# Patient Record
Sex: MALE | Race: WHITE | NOT HISPANIC OR LATINO | Employment: OTHER | URBAN - METROPOLITAN AREA
[De-identification: names, ages, dates, MRNs, and addresses within clinical notes are randomized per-mention and may not be internally consistent; named-entity substitution may affect disease eponyms.]

---

## 2021-07-22 ENCOUNTER — OFFICE VISIT (OUTPATIENT)
Dept: ORTHOPEDIC SURGERY | Facility: CLINIC | Age: 71
End: 2021-07-22

## 2021-07-22 VITALS
BODY MASS INDEX: 25.5 KG/M2 | DIASTOLIC BLOOD PRESSURE: 102 MMHG | SYSTOLIC BLOOD PRESSURE: 185 MMHG | HEIGHT: 77 IN | WEIGHT: 216 LBS | HEART RATE: 92 BPM

## 2021-07-22 DIAGNOSIS — M25.562 CHRONIC PAIN OF LEFT KNEE: ICD-10-CM

## 2021-07-22 DIAGNOSIS — M17.12 PRIMARY OSTEOARTHRITIS OF LEFT KNEE: Primary | ICD-10-CM

## 2021-07-22 DIAGNOSIS — G89.29 CHRONIC PAIN OF LEFT KNEE: ICD-10-CM

## 2021-07-22 PROCEDURE — 99204 OFFICE O/P NEW MOD 45 MIN: CPT | Performed by: PHYSICIAN ASSISTANT

## 2021-07-22 RX ORDER — FEXOFENADINE HCL 180 MG/1
180 TABLET ORAL DAILY
COMMUNITY
End: 2022-07-22

## 2021-07-22 RX ORDER — MULTIPLE VITAMINS W/ MINERALS TAB 9MG-400MCG
1 TAB ORAL DAILY
COMMUNITY

## 2021-07-22 RX ORDER — FAMOTIDINE 40 MG/1
1 TABLET, FILM COATED ORAL DAILY
COMMUNITY
Start: 2021-05-21

## 2021-07-22 RX ORDER — LOSARTAN POTASSIUM AND HYDROCHLOROTHIAZIDE 25; 100 MG/1; MG/1
1 TABLET ORAL DAILY
COMMUNITY
Start: 2021-05-04

## 2021-07-22 RX ORDER — ASPIRIN 81 MG/1
81 TABLET, CHEWABLE ORAL DAILY
COMMUNITY

## 2021-07-22 RX ORDER — IPRATROPIUM BROMIDE 42 UG/1
1 SPRAY, METERED NASAL AS NEEDED
COMMUNITY
Start: 2021-07-11 | End: 2022-07-22

## 2021-07-22 RX ORDER — PANTOPRAZOLE SODIUM 40 MG/1
1 TABLET, DELAYED RELEASE ORAL DAILY
COMMUNITY
Start: 2021-05-04

## 2021-07-22 NOTE — PROGRESS NOTES
[facility] [place] [facility] Note    Subjective     Chief Complaint   Patient presents with   • Left Knee - Pain        HPI  Dewey Avalos is a 70 y.o. male. New patient presents for evaluation of his left knee.  Symptoms/pain have been ongoing for 2-3 years.  YAMILE: No trauma, just wear and tear over the years.  History left knee arthroscopy [id number] by Dr. Bingham for debridement and Baker's cyst.    Pain scale: 3/10.  Severity of the pain mild to moderate.  Quality of the pain aching.  Associated symptoms swelling.  Activity related to pain walking, standing.  Pain relieved by rest, ice.  No reported numbness or tingling.  Prior treatments PT ([facility] in [place], [place]), steroid injection (10/2020-great, 5/2021-no relief), Aleve, Tylenol.    Now any increase in activity, increases knee pain.    No reported mechanical symptoms, such as locking or catching.    Denies fever, chills, night sweats or other constitutional symptoms.      Past Medical History:   Diagnosis Date   • Arthritis    • Back problem    • Hypertension    • Plantar fasciitis       Past Surgical History:   Procedure Laterality Date   • KNEE SURGERY Left 03/28/2014    Deshawn Alessioanayeli Proctor in [place] performed knee scope      Family History   Problem Relation Age of Onset   • Arthritis Mother    • Heart disease Father    • Arthritis Father    • Diabetes Brother    • Cancer Maternal Grandmother    • Diabetes Paternal Grandfather      Social History     Socioeconomic History   • Marital status:      Spouse name: Not on file   • Number of children: Not on file   • Years of education: Not on file   • Highest education level: Not on file   Tobacco Use   • Smoking status: Never Smoker   • Smokeless tobacco: Never Used   Substance and Sexual Activity   • Alcohol use: Yes     Comment: 12 drinks/week   • Drug use: Never      Current Outpatient Medications on File Prior to Visit   Medication Sig Dispense Refill   • aspirin 81 MG chewable  "tablet Chew 81 mg Daily.     • famotidine (PEPCID) 40 MG tablet Take 1 tablet by mouth Daily.     • fexofenadine (ALLEGRA) 180 MG tablet Take 180 mg by mouth Daily.     • ipratropium (ATROVENT) 0.06 % nasal spray 1 spray into the nostril(s) as directed by provider As Needed.     • losartan-hydrochlorothiazide (HYZAAR) 100-25 MG per tablet Take 1 tablet by mouth Daily.     • multivitamin with minerals (CENTRUM SILVER PO) Take 1 tablet by mouth Daily.     • pantoprazole (PROTONIX) 40 MG EC tablet Take 1 tablet by mouth Daily.       No current facility-administered medications on file prior to visit.      No Known Allergies     The following portions of the patient's history were reviewed and updated as appropriate: allergies, current medications, past family history, past medical history, past social history, past surgical history and problem list.    Review of Systems   Constitutional: Positive for activity change.   HENT: Positive for tinnitus.    Eyes: Negative.    Respiratory: Positive for cough.    Cardiovascular: Negative.    Gastrointestinal: Negative.    Endocrine: Negative.    Genitourinary: Positive for frequency.   Musculoskeletal: Positive for arthralgias and joint swelling.   Skin: Negative.    Allergic/Immunologic: Negative.    Neurological: Negative.    Hematological: Negative.    Psychiatric/Behavioral: Negative.         Objective      Physical Exam  BP (!) 185/102   Pulse 92   Ht 195.6 cm (77\")   Wt 98 kg (216 lb)   BMI 25.61 kg/m²     Body mass index is 25.61 kg/m².    GENERAL APPEARANCE: awake, alert & oriented x 3, in no acute distress and well developed, well nourished  PSYCH: normal mood and affect  LUNGS:  breathing nonlabored, no wheezing  EYES: sclera anicteric, pupils equal  CARDIOVASCULAR: palpable pulses. Capillary refill less than 2 seconds  INTEGUMENTARY: skin intact, no clubbing, cyanosis  NEUROLOGIC:  Normal Sensation         Ortho Exam  Left Knee  Alignment: Varus  Skin: Intact " without any erythema, warmth, swelling or evidence of infection.  Motion: 0-125° with crepitus.  Tenderness: Positive medial joint line, harshil/retropatellar.    Instability: Lachman  negative. Varus and valgus stress test in 0 and 30° negative.   Meniscus: Paula's medial pain without catch or click.    Patella: Compression/grind positive.  Motor: Grossly intact Q/HS/TA/GS/EHL/P  Sensory: Grossly intact DP/SP/S/S/T nerve distributions.  Vascular: 2+ DP/PT  pulses with brisk capillary refill into each digit.      Imaging/Studies  Ordered left knee plain films.  Imaging read/interpreted by Dr. Mendez    Imaging Results (Last 7 Days)     Procedure Component Value Units Date/Time    XR Knee 4+ View Left [[id number]] Resulted: 07/22/21 0925     Updated: 07/22/21 0928    Narrative:      Knee X-Ray    Indication: Pain    Study:  Upright AP, Skiers, Lateral, and Sunrise views of Left knee(s)    Comparison: None    Findings:    Patient appears to have severe degenerative changes in the medial   compartment.    There are mild degenerative changes in the lateral compartment.    There are severe changes in the patellofemoral compartment.    Patient has overall varus alignment.    Kellgren-Rakehs thGthrthathdtheth:th th5th Impression:   Severe medial compartment and  patellofemoral compartment degnerative   changes of the knee           Assessment/Plan        ICD-10-CM ICD-9-CM   1. Primary osteoarthritis of left knee  M17.12 715.16   2. Chronic pain of left knee  M25.562 719.46    G89.29 338.29       Orders Placed This Encounter   Procedures   • XR Knee 4+ View Left        -Primary osteoarthritis left knee causing chronic pain.  -Patient is a candidate for TKA but wants to exhaust all conservative measures first.  When he is ready for TKA, he would like to see Dr. Ugalde.  -Has never had visco supplementation or PRP.  -Would like to try visco injections--pre-authorization placed today.  -If not approved, did discuss PRP (understands this  procedure is self-pay) versus repeat steroid injection.  -Offered prescription strength NSAID, but will continue with Aleve for now.  -Offered repeat formal PT but instead chose home knee exercises (provided).  -Follow up when injections available to be started.  -Questions and concerns answered.      Medical Decision Making  Management Options : prescription/IM medicine  Data/Risk: radiology tests       Leticia Bello PA-C  07/22/21  14:10 EDT         EMR Dragon/Transcription disclaimer:  Much of this encounter note is an electronic transcription of spoken language to printed text. Electronic transcription of spoken language may permit erroneous, or at times, nonsensical words or phrases to be inadvertently transcribed. Although I have reviewed the note for such errors, some may still exist.

## 2021-08-26 ENCOUNTER — CLINICAL SUPPORT (OUTPATIENT)
Dept: ORTHOPEDIC SURGERY | Facility: CLINIC | Age: 71
End: 2021-08-26

## 2021-08-26 DIAGNOSIS — M17.12 PRIMARY OSTEOARTHRITIS OF LEFT KNEE: ICD-10-CM

## 2021-08-26 PROCEDURE — 20610 DRAIN/INJ JOINT/BURSA W/O US: CPT | Performed by: PHYSICIAN ASSISTANT

## 2021-08-26 NOTE — PROGRESS NOTES
CC: Follow-up left knee osteoarthritis, 1/3 Synvisc injection today    History of present illness: Patient presents for his first Synvisc injection to the left knee today.  At this time he denies any numbness or tingling into the distal extremity.  No fever, chills, night sweats or other constitutional symptoms.    See chart for PMH, PSH, Meds, All - reviewed.    Ortho exam:  Left knee  Skin is intact without redness, warmth or swelling/effusion.  No lesions or evidence of infection noted.  Motor/sensory: Grossly intact L2-S1.    Assessment/plan:  Left knee osteoarthritis    Proceed today with 1/3 of Synvisc injection.  Patient will follow-up in week for second injection.      After discussing risks of injection the patient gave consent to proceed.  His left knee was confirmed as the correct joint to be injected with a timeout.  The knee was then prepped with Hibiclens and injected with a prefilled syringe of Orthovisc without any resistance using anterior lateral approach, patient in seated position.  The patient tolerated procedure well.  Hemostasis was achieved and a Band-Aid was applied over the injection site.  I instructed the patient on signs and symptoms of infection.  They should report to the ED if any of these develop.  Recommended modifying activity to include rest, ice, elevation and/or heat along with oral pain medication as needed.  Patient observed ambulating normally after the injection.

## 2021-08-26 NOTE — PROGRESS NOTES
Procedure   Large Joint Arthrocentesis: L knee  Date/Time: 8/26/2021 8:28 AM  Consent given by: patient  Site marked: site marked  Timeout: Immediately prior to procedure a time out was called to verify the correct patient, procedure, equipment, support staff and site/side marked as required   Supporting Documentation  Indications: pain   Procedure Details  Location: knee - L knee  Preparation: Patient was prepped and draped in the usual sterile fashion  Needle size: 23 G  Approach: anterolateral  Medications administered: 8 mg/mL Hylan 16 MG/2ML  Patient tolerance: patient tolerated the procedure well with no immediate complications

## 2021-09-02 ENCOUNTER — CLINICAL SUPPORT (OUTPATIENT)
Dept: ORTHOPEDIC SURGERY | Facility: CLINIC | Age: 71
End: 2021-09-02

## 2021-09-02 DIAGNOSIS — M17.12 PRIMARY OSTEOARTHRITIS OF LEFT KNEE: Primary | ICD-10-CM

## 2021-09-02 PROCEDURE — 20610 DRAIN/INJ JOINT/BURSA W/O US: CPT | Performed by: PHYSICIAN ASSISTANT

## 2021-09-02 NOTE — PROGRESS NOTES
Procedure   Large Joint Arthrocentesis: L knee  Date/Time: 9/2/2021 8:23 AM  Consent given by: patient  Site marked: site marked  Timeout: Immediately prior to procedure a time out was called to verify the correct patient, procedure, equipment, support staff and site/side marked as required   Supporting Documentation  Indications: pain   Procedure Details  Location: knee - L knee  Preparation: Patient was prepped and draped in the usual sterile fashion  Needle size: 23 G  Approach: anterolateral  Medications administered: 8 mg/mL Hylan 16 MG/2ML  Patient tolerance: patient tolerated the procedure well with no immediate complications

## 2021-09-02 NOTE — PROGRESS NOTES
CC: Follow-up left knee osteoarthritis, 2/3 Synvisc injection today     History of present illness: Patient presents for his second Synvisc injection to the left knee today.  At this time he denies any numbness or tingling into the distal extremity.  No fever, chills, night sweats or other constitutional symptoms.    Patient reports he feels better having started the injections but he did notice some increased pain last couple days with going downstairs.     See chart for PMH, PSH, Meds, All - reviewed.     Ortho exam:  Left knee  Skin is intact without redness, warmth or swelling/effusion.  No lesions or evidence of infection noted.  Motor/sensory: Grossly intact L2-S1.     Assessment/plan:  Left knee osteoarthritis     Proceed today with 2/3 of Synvisc injection.  Patient will follow-up in week for third injection.       After discussing risks of injection the patient gave consent to proceed.  His left knee was confirmed as the correct joint to be injected with a timeout.  The knee was then prepped with Hibiclens and injected with a prefilled syringe of Orthovisc without any resistance using anterior lateral approach, patient in seated position.  The patient tolerated procedure well.  Hemostasis was achieved and a Band-Aid was applied over the injection site.  I instructed the patient on signs and symptoms of infection.  They should report to the ED if any of these develop.  Recommended modifying activity to include rest, ice, elevation and/or heat along with oral pain medication as needed.  Patient observed ambulating normally after the injection.

## 2021-09-09 ENCOUNTER — CLINICAL SUPPORT (OUTPATIENT)
Dept: ORTHOPEDIC SURGERY | Facility: CLINIC | Age: 71
End: 2021-09-09

## 2021-09-09 DIAGNOSIS — M17.12 PRIMARY OSTEOARTHRITIS OF LEFT KNEE: Primary | ICD-10-CM

## 2021-09-09 PROCEDURE — 20610 DRAIN/INJ JOINT/BURSA W/O US: CPT | Performed by: PHYSICIAN ASSISTANT

## 2021-09-09 NOTE — PROGRESS NOTES
CC: Follow-up left knee osteoarthritis, 3/3 Synvisc injection today     History of present illness: Patient presents for his third Synvisc injection to the left knee today.  At this time he denies any numbness or tingling into the distal extremity.  No fever, chills, night sweats or other constitutional symptoms.     Patient continues to note improvement with regards to pain control following start of the injection series.  He is happy with the outcome thus far.     See chart for PMH, PSH, Meds, All - reviewed.     Ortho exam:  Left knee  Skin is intact without redness, warmth or swelling/effusion.  No lesions or evidence of infection noted.  Motor/sensory: Grossly intact L2-S1.     Assessment/plan:  Left knee osteoarthritis     Proceed today with 3/3 of Synvisc injection.  Patient will follow-up in 6 months.  If he notes increasing pain sooner he will return to clinic sooner.  He was instructed that approximately 2 weeks prior to the appointment to give us a call if he would like to consider another viscosupplementation series so we can go on to begin the preauthorization process.       After discussing risks of injection the patient gave consent to proceed.  His left knee was confirmed as the correct joint to be injected with a timeout.  The knee was then prepped with Hibiclens and injected with a prefilled syringe of Orthovisc without any resistance using anterior lateral approach, patient in seated position.  The patient tolerated procedure well.  Hemostasis was achieved and a Band-Aid was applied over the injection site.  I instructed the patient on signs and symptoms of infection.  They should report to the ED if any of these develop.  Recommended modifying activity to include rest, ice, elevation and/or heat along with oral pain medication as needed.  Patient observed ambulating normally after the injection.

## 2021-09-09 NOTE — PROGRESS NOTES
Procedure   Large Joint Arthrocentesis: L knee  Date/Time: 9/9/2021 8:28 AM  Consent given by: patient  Site marked: site marked  Timeout: Immediately prior to procedure a time out was called to verify the correct patient, procedure, equipment, support staff and site/side marked as required   Supporting Documentation  Indications: pain   Procedure Details  Location: knee - L knee  Preparation: Patient was prepped and draped in the usual sterile fashion  Needle size: 23 G  Approach: anterolateral  Medications administered: 8 mg/mL Hylan 16 MG/2ML  Patient tolerance: patient tolerated the procedure well with no immediate complications

## 2022-02-17 ENCOUNTER — TELEPHONE (OUTPATIENT)
Dept: ORTHOPEDIC SURGERY | Facility: CLINIC | Age: 72
End: 2022-02-17

## 2022-02-17 NOTE — TELEPHONE ENCOUNTER
Caller: MELVIN MANZANO    Relationship to patient: SELF    Best call back number: [phone]    Chief complaint: LEFT KNEE PAIN    Type of visit: GEL INJECTIONS    Requested date: 3/10    If rescheduling, when is the original appointment: N/A    Additional notes: WAS TOLD HE COULD DO GEL SHOTS WHEN HE COMES IN FOR HIS FOLLOW UP IN MARCH

## 2022-02-18 ENCOUNTER — TELEPHONE (OUTPATIENT)
Dept: ORTHOPEDIC SURGERY | Facility: CLINIC | Age: 72
End: 2022-02-18

## 2022-02-18 DIAGNOSIS — M17.12 PRIMARY OSTEOARTHRITIS OF LEFT KNEE: Primary | ICD-10-CM

## 2022-02-18 NOTE — TELEPHONE ENCOUNTER
Called Pt to inform him that Leticia would have to put a new order in to make sure Injections are approved. Once approved we will contact him.        Can an order be put in for Gel injections please

## 2022-02-18 NOTE — TELEPHONE ENCOUNTER
Leticia- Pt wanting to repeat gel injections when he comes in for his follow up 3/10. Can you go ahead and place order for left knee Visco?    We did SynVisc last time just FYI.    Thanks!  Rubi

## 2022-03-10 ENCOUNTER — CLINICAL SUPPORT (OUTPATIENT)
Dept: ORTHOPEDIC SURGERY | Facility: CLINIC | Age: 72
End: 2022-03-10

## 2022-03-10 DIAGNOSIS — M17.12 PRIMARY OSTEOARTHRITIS OF LEFT KNEE: Primary | ICD-10-CM

## 2022-03-10 PROCEDURE — 20610 DRAIN/INJ JOINT/BURSA W/O US: CPT | Performed by: PHYSICIAN ASSISTANT

## 2022-03-10 NOTE — PROGRESS NOTES
Procedure   Large Joint Arthrocentesis: L knee  Date/Time: 3/10/2022 9:05 AM  Consent given by: patient  Site marked: site marked  Timeout: Immediately prior to procedure a time out was called to verify the correct patient, procedure, equipment, support staff and site/side marked as required   Supporting Documentation  Indications: pain   Procedure Details  Location: knee - L knee  Preparation: Patient was prepped and draped in the usual sterile fashion  Needle size: 22 G  Approach: anterolateral  Medications administered: 8 mg/mL Hylan 16 MG/2ML  Patient tolerance: patient tolerated the procedure well with no immediate complications

## 2022-03-10 NOTE — PROGRESS NOTES
CC: Follow-up left knee osteoarthritis, 1/3 Synvisc injection today     History of present illness: Patient presents for his first Synvisc injection to the left knee today.  At this time he denies any numbness or tingling into the distal extremity.  No fever, chills, night sweats or other constitutional symptoms.    Previous series completed 9/9/2021.  Patient states that he noted improvement approximately 2 to 3 weeks after the series was completed.  He is okay now but starting to have pain return to the knee.     See chart for PMH, PSH, Meds, All - reviewed.     Ortho exam:  Left knee  Skin is intact without redness, warmth or swelling/effusion.  No lesions or evidence of infection noted.  Tenderness: Medial joint line.  Motor/sensory: Grossly intact L2-S1.     Assessment/plan:  Left knee osteoarthritis     Proceed today with 1/3 of Synvisc injection.  Patient will follow-up in 1 week for 2 of 3 Synvisc injection.  Patient also requesting to have his hip looked at at either next week's appointment or the following appointment.  Patient will require imaging of the hip for that appointment.      After discussing risks of injection the patient gave consent to proceed.  His left knee was confirmed as the correct joint to be injected with a timeout.  The knee was then prepped with Hibiclens and injected with a prefilled syringe of Orthovisc without any resistance using anterior lateral approach, patient in seated position.  The patient tolerated procedure well.  Hemostasis was achieved and a Band-Aid was applied over the injection site.  I instructed the patient on signs and symptoms of infection.  They should report to the ED if any of these develop.  Recommended modifying activity to include rest, ice, elevation and/or heat along with oral pain medication as needed.  Patient observed ambulating normally after the injection.

## 2022-03-17 ENCOUNTER — CLINICAL SUPPORT (OUTPATIENT)
Dept: ORTHOPEDIC SURGERY | Facility: CLINIC | Age: 72
End: 2022-03-17

## 2022-03-17 DIAGNOSIS — M17.12 PRIMARY OSTEOARTHRITIS OF LEFT KNEE: ICD-10-CM

## 2022-03-17 PROCEDURE — 20610 DRAIN/INJ JOINT/BURSA W/O US: CPT | Performed by: PHYSICIAN ASSISTANT

## 2022-03-17 NOTE — PROGRESS NOTES
CC: Follow-up left knee osteoarthritis, 2/3 Synvisc injection today     History of present illness: Patient presents for his second Synvisc injection to the left knee today.  At this time he denies any numbness or tingling into the distal extremity.  No fever, chills, night sweats or other constitutional symptoms.     Patient states his left knee is doing good.     See chart for PMH, PSH, Meds, All - reviewed.     Ortho exam:  Left knee  Skin is intact without redness, warmth or swelling/effusion.  No lesions or evidence of infection noted.  Tenderness: Medial joint line.  Motor/sensory: Grossly intact L2-S1.     Assessment/plan:  Left knee osteoarthritis     Proceed today with 2/3 of Synvisc injection.  Patient will follow-up in 1 week for third Synvisc injection.  Patient will have his hip looked at next week.  We will need preclinic imaging.        After discussing risks of injection the patient gave consent to proceed.  His left knee was confirmed as the correct joint to be injected with a timeout.  The knee was then prepped with Hibiclens and injected with a prefilled syringe of Orthovisc without any resistance using anterior lateral approach, patient in seated position.  The patient tolerated procedure well.  Hemostasis was achieved and a Band-Aid was applied over the injection site.  I instructed the patient on signs and symptoms of infection.  They should report to the ED if any of these develop.  Recommended modifying activity to include rest, ice, elevation and/or heat along with oral pain medication as needed.  Patient observed ambulating normally after the injection.

## 2022-03-17 NOTE — PROGRESS NOTES
Procedure   - Large Joint Arthrocentesis: L knee on 3/17/2022 1:07 PM  Indications: pain  Details: 22 G needle, anterolateral approach  Medications: 8 mg/mL Hylan 16 MG/2ML  Outcome: tolerated well, no immediate complications  Procedure, treatment alternatives, risks and benefits explained, specific risks discussed. Consent was given by the patient. Immediately prior to procedure a time out was called to verify the correct patient, procedure, equipment, support staff and site/side marked as required. Patient was prepped and draped in the usual sterile fashion.

## 2022-03-24 ENCOUNTER — CLINICAL SUPPORT (OUTPATIENT)
Dept: ORTHOPEDIC SURGERY | Facility: CLINIC | Age: 72
End: 2022-03-24

## 2022-03-24 DIAGNOSIS — M17.12 PRIMARY OSTEOARTHRITIS OF LEFT KNEE: ICD-10-CM

## 2022-03-24 DIAGNOSIS — M16.11 PRIMARY OSTEOARTHRITIS OF RIGHT HIP: ICD-10-CM

## 2022-03-24 DIAGNOSIS — M54.50 RIGHT-SIDED LOW BACK PAIN WITHOUT SCIATICA, UNSPECIFIED CHRONICITY: ICD-10-CM

## 2022-03-24 DIAGNOSIS — M25.551 RIGHT HIP PAIN: Primary | ICD-10-CM

## 2022-03-24 PROCEDURE — 99214 OFFICE O/P EST MOD 30 MIN: CPT | Performed by: PHYSICIAN ASSISTANT

## 2022-03-24 PROCEDURE — 20610 DRAIN/INJ JOINT/BURSA W/O US: CPT | Performed by: PHYSICIAN ASSISTANT

## 2022-03-24 NOTE — PROGRESS NOTES
Procedure        [facility] [place] [facility] Note        Subjective     CC: Pain of the Right Hip and Follow-up (Left knee synvisc #2 injection)      TAYE Avalos is a 71 y.o. male. Patient returns today for his 3/3 Synvisc injection to his left knee and wanting his right hip evaluated. Knee doing well with Synvisc injections.    Right hip pain times 3 months. Localized pain to right back into buttock/SI region, occasionally felt groin discomfort. Has been doing pelvic exercises on own which has helped his back. In college he did sustain injury to right side, undercut during lay-up.    Pain scale: 5/10.  Severity of the pain intermittent, moderate.  Quality of the pain dull, aching, throbbing.  Associated symptoms stiffness.  Activity related to pain walking, stair climbing.  Pain relieved by resting.  No reported numbness or tingling.  Prior treatments home exercises, NSAIDs.     ROS:    Constiutional:Pt denies fever, chills, nausea, or vomiting.  MSK:as above        Objective      Past Medical History  Past Medical History:   Diagnosis Date   • Ankle sprain     Numerous sprains over the years.   • Arthritis    • Arthritis of back 2012   • Back problem    • Hip arthrosis 2013   • Hypertension    • Knee swelling 2014   • Lumbosacral disc disease 2018   • Periarthritis of shoulder 2013   • Plantar fasciitis          Physical Exam  There were no vitals taken for this visit.    There is no height or weight on file to calculate BMI.    Patient is well nourished and well developed.        Ortho Exam  Right Hip  Skin: Grossly intact without any erythema, warmth or swelling.  Tenderness: Right lower back into SI/buttock region. No groin pain today.  Motion: Flx 115°, internal rotation 30°, external rotation 30°, abduction 45°.   Testing: Stinchfield negative, Hip flxe to 90° with IR/ER negative, SLR intact and pain-free.  Strength: Hip flx/ext/abd 5/5, Q/HS 5/5.  Motor: Grossly intact  Q/HS/TA/GS/EHL/P.  Sensory: Grossly intact to LT L2-S1.    Left knee  Skin is intact without redness, warmth or swelling/effusion.  No lesions or evidence of infection noted.  Motor/sensory: Grossly intact L2-S1.      Imaging/Labs/EMG Reviewed:  Ordered right hip plain films.  Imaging read/interpreted by Dr. Burgos.    Indication: Right hip pain     Comparison: No prior xrays are available for comparison     IMPRESSION:      AP pelvis, right hip: moderate joint space narrowing,  there are marginal osteophytes visualized at the femoral head-neck junction and acetabular margins  AP pelvis, left hip: moderate joint space narrowing,  there are marginal osteophytes visualized at the femoral head-neck junction and acetabular margins.       Assessment:  1. Right hip pain    2. Right-sided low back pain without sciatica, unspecified chronicity    3. Primary osteoarthritis of right hip    4. Primary osteoarthritis of left knee        Plan:  1. Rip hip pain/low back pain--may need further evaluation of low back if symptoms persist.  2. Moderate arthritis of right hip--discussed exercises, NSAIDs--if symptoms persist may require intra-articular hip injection to isolate whether symptoms coming from back or hip joint, only moderate hip degenerative joint changes on imaging.  3. Left knee arthritis--provided 3/3 Synvisc injection today.  4. Offered prescription strength NSAIDs, wants to continue with OTC as needed.  5. Follow up in 6 months for repeat visco series to left knee. Understands he needs to contact clinic 1-2 weeks in advance for pre-authorization of injection.  6. Questions and concerns answered.    After discussing risks of injection the patient gave consent to proceed.  His left knee was confirmed as the correct joint to be injected with a timeout.  The knee was then prepped with Hibiclens and injected with a prefilled syringe of Orthovisc without any resistance using anterior lateral approach, patient in seated  position.  The patient tolerated procedure well.  Hemostasis was achieved and a Band-Aid was applied over the injection site.  I instructed the patient on signs and symptoms of infection.  They should report to the ED if any of these develop.  Recommended modifying activity to include rest, ice, elevation and/or heat along with oral pain medication as needed.  Patient observed ambulating normally after the injection.      Leticia Bello PA-C  03/27/22  12:08 EDT      Dictated Utilizing Dragon Dictation.  Large Joint Arthrocentesis: L knee  Date/Time: 3/24/2022 10:14 AM  Consent given by: patient  Site marked: site marked  Timeout: Immediately prior to procedure a time out was called to verify the correct patient, procedure, equipment, support staff and site/side marked as required   Supporting Documentation  Indications: pain   Procedure Details  Location: knee - L knee  Preparation: Patient was prepped and draped in the usual sterile fashion  Needle size: 22 G  Approach: anterolateral  Medications administered: 8 mg/mL Hylan 16 MG/2ML  Patient tolerance: patient tolerated the procedure well with no immediate complications

## 2022-05-02 DIAGNOSIS — R05.9 COUGH: Primary | ICD-10-CM

## 2022-07-22 ENCOUNTER — OFFICE VISIT (OUTPATIENT)
Dept: ORTHOPEDIC SURGERY | Facility: CLINIC | Age: 72
End: 2022-07-22

## 2022-07-22 VITALS
DIASTOLIC BLOOD PRESSURE: 98 MMHG | SYSTOLIC BLOOD PRESSURE: 166 MMHG | HEIGHT: 77 IN | WEIGHT: 210 LBS | BODY MASS INDEX: 24.79 KG/M2

## 2022-07-22 DIAGNOSIS — M16.11 PRIMARY OSTEOARTHRITIS OF RIGHT HIP: ICD-10-CM

## 2022-07-22 DIAGNOSIS — M54.50 RIGHT-SIDED LOW BACK PAIN WITHOUT SCIATICA, UNSPECIFIED CHRONICITY: ICD-10-CM

## 2022-07-22 DIAGNOSIS — M25.551 RIGHT HIP PAIN: Primary | ICD-10-CM

## 2022-07-22 PROCEDURE — 99214 OFFICE O/P EST MOD 30 MIN: CPT | Performed by: PHYSICIAN ASSISTANT

## 2022-07-22 RX ORDER — DOXAZOSIN 2 MG/1
TABLET ORAL
COMMUNITY
Start: 2022-06-03

## 2022-07-22 NOTE — PROGRESS NOTES
"    [facility] [place] [facility] Note        Subjective     CC: Follow-up (4 month f/u - Right hip pain )      HPI    Dewey Avalos is a 71 y.o. male. Established patient presents for evaluation of right hip pain.  Symptoms/pain have been ongoing for month.  YAMILE: No recent injury or trauma. Initially discussed right hip/buttock/SI pain with occasional groin discomfort back in March (did have injury to right side--undercut during layup). Pain is worsening.    Pain scale: 8/10.  Severity of the pain moderate to severe.  Quality of the pain aching, throbbing.  Associated symptoms stiffness.  Activity related to pain walking, stair climbing, lying on the affected side, rising from a seated position.  Pain eased by resting.  No reported numbness or tingling.  Prior treatments home exercises, NSAIDs, PT.    Localizes pain to inner thigh/groin and lateral hip, +/- buttock pain.    Overall, patient's symptoms are worsening.    ROS:    Constiutional:Pt denies fever, chills, nausea, or vomiting.  MSK:as above        Objective      Past Medical History  Past Medical History:   Diagnosis Date   • Ankle sprain     Numerous sprains over the years.   • Arthritis    • Arthritis of back 2012   • Back problem    • Hip arthrosis 2013   • Hypertension    • Knee swelling 2014   • Lumbosacral disc disease 2018   • Periarthritis of shoulder 2013   • Plantar fasciitis          Physical Exam  /98   Ht 195.6 cm (77\")   Wt 95.3 kg (210 lb)   BMI 24.90 kg/m²     Body mass index is 24.9 kg/m².    Patient is well nourished and well developed.        Ortho Exam  Right Hip  Tenderness: Anterior hip/flexors positive.  Groin positive.  Lateral hip/GT positive. Posterior hip/SIJ mild discomfort. Positive \"C\" sign.  Motion: Flx 110°, internal rotation 30°, external rotation 30°.   Testing: Stinchfield positive, Hip flxe to 90° with IR/ER negative.  Strength: Hip flx/ext/abd 5/5, Q/HS 5/5.  Motor: Grossly intact Q/HS/TA/GS/EHL/P.  Sensory: " Grossly intact to LT L2-S1.      Imaging/Labs/EMG Reviewed:  No new imaging today.      Assessment:  1. Right hip pain    2. Primary osteoarthritis of right hip    3. Right-sided low back pain without sciatica, unspecified chronicity        Plan:  1. Rip hip pain, hip osteoarthritis and right sided low back pain--worsening right hip pain.  2. Moderate arthritis of right hip--discussed right hip x-rays today, which demonstrated arthritis. Recommend proceed with a right intra-articular hip injection, for both diagnostic and therapeutic purposes to isolate whether symptoms coming from back or hip joint.  3. Right intra-articular hip injection ordered.  4. Recommend OTC NSAIDs/pain medication.   5. Follow up in 4 weeks after injection.  6. Questions and concerns answered.      Leticia Bello PA-C  07/22/22  11:13 EDT      Dictated Utilizing Dragon Dictation.

## 2022-07-22 NOTE — PROGRESS NOTES
[facility] [place] [facility] Note    Subjective     Chief Complaint   Patient presents with   • Right Hip - Pain        HPI  Dewey Avalos is a 71 y.o. male.  ***    Past Medical History:   Diagnosis Date   • Ankle sprain     Numerous sprains over the years.   • Arthritis    • Arthritis of back 2012   • Back problem    • Hip arthrosis 2013   • Hypertension    • Knee swelling 2014   • Lumbosacral disc disease 2018   • Periarthritis of shoulder 2013   • Plantar fasciitis       Past Surgical History:   Procedure Laterality Date   • KNEE SURGERY Left 03/28/2014    Deshawn Proctor in [place] performed knee scope      Family History   Problem Relation Age of Onset   • Arthritis Mother    • Heart disease Father    • Arthritis Father    • Diabetes Brother    • Cancer Maternal Grandmother    • Diabetes Paternal Grandfather    • Diabetes Brother      Social History     Socioeconomic History   • Marital status:    Tobacco Use   • Smoking status: Never Smoker   • Smokeless tobacco: Never Used   Substance and Sexual Activity   • Alcohol use: Yes     Alcohol/week: 12.0 standard drinks     Types: 12 Cans of beer per week     Comment: 12 drinks/week   • Drug use: Never   • Sexual activity: Yes     Partners: Female     Birth control/protection: None      Current Outpatient Medications on File Prior to Visit   Medication Sig Dispense Refill   • aspirin 81 MG chewable tablet Chew 81 mg Daily.     • famotidine (PEPCID) 40 MG tablet Take 1 tablet by mouth Daily.     • fexofenadine (ALLEGRA) 180 MG tablet Take 180 mg by mouth Daily.     • ipratropium (ATROVENT) 0.06 % nasal spray 1 spray into the nostril(s) as directed by provider As Needed.     • losartan-hydrochlorothiazide (HYZAAR) 100-25 MG per tablet Take 1 tablet by mouth Daily.     • multivitamin with minerals tablet tablet Take 1 tablet by mouth Daily.     • pantoprazole (PROTONIX) 40 MG EC tablet Take 1 tablet by mouth Daily.       No current  "facility-administered medications on file prior to visit.      No Known Allergies     The following portions of the patient's history were reviewed and updated as appropriate: {history reviewed:20406::\"allergies\",\"current medications\",\"past family history\",\"past medical history\",\"past social history\",\"past surgical history\",\"problem list\"}.    Review of Systems   Constitutional: Negative.    HENT: Negative.    Eyes: Negative.    Respiratory: Negative.    Cardiovascular: Negative.    Gastrointestinal: Negative.    Endocrine: Negative.    Genitourinary: Negative.    Musculoskeletal: Positive for arthralgias.   Skin: Negative.    Allergic/Immunologic: Negative.    Neurological: Negative.    Hematological: Negative.    Psychiatric/Behavioral: Negative.         Objective      Physical Exam  There were no vitals taken for this visit.    There is no height or weight on file to calculate BMI.    GENERAL APPEARANCE: awake, alert & oriented x 3, in no acute distress and well developed, well nourished  PSYCH: normal mood and affect  LUNGS:  breathing nonlabored, no wheezing  EYES: sclera anicteric, pupils equal  CARDIOVASCULAR: palpable pulses. Capillary refill less than 2 seconds  INTEGUMENTARY: skin intact, no clubbing, cyanosis  NEUROLOGIC:  Normal Sensation and reflexes       Ortho Exam  ***    Imaging/Studies  Imaging Results (Last 7 Days)     ** No results found for the last 168 hours. **          Assessment/Plan      No diagnosis found.    No orders of the defined types were placed in this encounter.       ***    Medical Decision Making  Management Options : {[facility] - Management Options:74987}  Data/Risk: {[facility] - Data/Risk:45847}    Nnamdi Brizuela MA  07/22/22  09:02 EDT               EMR Dragon/Transcription disclaimer:  Much of this encounter note is an electronic transcription of spoken language to printed text. Electronic transcription of spoken language may permit erroneous, or at times, nonsensical words or " phrases to be inadvertently transcribed. Although I have reviewed the note for such errors, some may still exist.

## 2022-07-27 ENCOUNTER — TELEPHONE (OUTPATIENT)
Dept: ORTHOPEDIC SURGERY | Facility: CLINIC | Age: 72
End: 2022-07-27

## 2022-07-27 NOTE — TELEPHONE ENCOUNTER
08 03 22 GUIDED INJ 5TH FL 07/27/2022 PATIENT CALLED MADE FOLLOW UP AFTER THE 08 03 22 GUIDED INJ // HOWEVER - NEEDS TO KNOW WHEN TO STOP HIS BLOOD THINNER (ASPIRIN)?  HUB ADVISED APTIENT SOMEONE FROM CLINICAL WOULD CALL HIM WITH THAT INFORMATION.

## 2022-08-03 ENCOUNTER — HOSPITAL ENCOUNTER (OUTPATIENT)
Dept: GENERAL RADIOLOGY | Facility: HOSPITAL | Age: 72
Discharge: HOME | End: 2022-08-03
Admitting: PHYSICIAN ASSISTANT

## 2022-08-03 DIAGNOSIS — M16.11 PRIMARY OSTEOARTHRITIS OF RIGHT HIP: ICD-10-CM

## 2022-08-03 DIAGNOSIS — M25.551 RIGHT HIP PAIN: ICD-10-CM

## 2022-08-03 PROCEDURE — 77002 NEEDLE LOCALIZATION BY XRAY: CPT

## 2022-08-03 PROCEDURE — 25010000002 TRIAMCINOLONE PER 10 MG: Performed by: PHYSICIAN ASSISTANT

## 2022-08-03 PROCEDURE — 25010000002 IOPAMIDOL 61 % SOLUTION: Performed by: PHYSICIAN ASSISTANT

## 2022-08-03 RX ORDER — LIDOCAINE HYDROCHLORIDE 10 MG/ML
5 INJECTION, SOLUTION EPIDURAL; INFILTRATION; INTRACAUDAL; PERINEURAL ONCE
Status: COMPLETED | OUTPATIENT
Start: 2022-08-03 | End: 2022-08-03

## 2022-08-03 RX ORDER — LIDOCAINE HYDROCHLORIDE 10 MG/ML
3 INJECTION, SOLUTION EPIDURAL; INFILTRATION; INTRACAUDAL; PERINEURAL ONCE
Status: DISCONTINUED | OUTPATIENT
Start: 2022-08-03 | End: 2022-08-04 | Stop reason: HOSPADM

## 2022-08-03 RX ORDER — TRIAMCINOLONE ACETONIDE 40 MG/ML
80 INJECTION, SUSPENSION INTRA-ARTICULAR; INTRAMUSCULAR ONCE
Status: COMPLETED | OUTPATIENT
Start: 2022-08-03 | End: 2022-08-03

## 2022-08-03 RX ORDER — BUPIVACAINE HYDROCHLORIDE 2.5 MG/ML
3 INJECTION, SOLUTION EPIDURAL; INFILTRATION; INTRACAUDAL ONCE
Status: DISCONTINUED | OUTPATIENT
Start: 2022-08-03 | End: 2022-08-04 | Stop reason: HOSPADM

## 2022-08-03 RX ADMIN — IOPAMIDOL 5 ML: 612 INJECTION, SOLUTION INTRAVENOUS at 08:53

## 2022-08-03 RX ADMIN — LIDOCAINE HYDROCHLORIDE 5 ML: 10 INJECTION, SOLUTION EPIDURAL; INFILTRATION; INTRACAUDAL; PERINEURAL at 08:53

## 2022-08-03 RX ADMIN — TRIAMCINOLONE ACETONIDE 80 MG: 40 INJECTION, SUSPENSION INTRA-ARTICULAR; INTRAMUSCULAR at 08:53

## 2022-08-29 ENCOUNTER — OFFICE VISIT (OUTPATIENT)
Dept: ORTHOPEDIC SURGERY | Facility: CLINIC | Age: 72
End: 2022-08-29

## 2022-08-29 VITALS
SYSTOLIC BLOOD PRESSURE: 132 MMHG | BODY MASS INDEX: 41.23 KG/M2 | HEIGHT: 60 IN | WEIGHT: 210 LBS | DIASTOLIC BLOOD PRESSURE: 86 MMHG

## 2022-08-29 DIAGNOSIS — M16.11 PRIMARY OSTEOARTHRITIS OF RIGHT HIP: Primary | ICD-10-CM

## 2022-08-29 PROCEDURE — 99212 OFFICE O/P EST SF 10 MIN: CPT | Performed by: PHYSICIAN ASSISTANT

## 2022-10-04 ENCOUNTER — TELEPHONE (OUTPATIENT)
Dept: ORTHOPEDIC SURGERY | Facility: CLINIC | Age: 72
End: 2022-10-04

## 2022-10-04 DIAGNOSIS — M17.12 PRIMARY OSTEOARTHRITIS OF LEFT KNEE: ICD-10-CM

## 2022-10-04 DIAGNOSIS — M16.11 PRIMARY OSTEOARTHRITIS OF RIGHT HIP: Primary | ICD-10-CM

## 2022-10-04 NOTE — TELEPHONE ENCOUNTER
Caller: Dewey Avalos    Relationship to patient: Self    Best call back number: [phone]    Chief complaint: LEFT KNEE - GELL INJETION    Type of visit: INJECTION (10.24.22)    Requested date: STARTING AT APPT 10.24.22 -     Additional notes:PATIENT WAS TOLD TO CALL A FEW WEEKS BEFORE APPT FOR OFFICE TO GET APPROVAL /AUTH FOR GET INJECTIONS LATER THIS MONTH. PLEASE CALL AND ADVISE PATIENT ON SCHEDULING  FOR INJECTIONS. TY

## 2022-10-24 ENCOUNTER — CLINICAL SUPPORT (OUTPATIENT)
Dept: ORTHOPEDIC SURGERY | Facility: CLINIC | Age: 72
End: 2022-10-24

## 2022-10-24 DIAGNOSIS — M17.12 PRIMARY OSTEOARTHRITIS OF LEFT KNEE: Primary | ICD-10-CM

## 2022-10-24 PROCEDURE — 20610 DRAIN/INJ JOINT/BURSA W/O US: CPT | Performed by: PHYSICIAN ASSISTANT

## 2022-10-24 NOTE — PROGRESS NOTES
CC: Follow-up left knee osteoarthritis, 1/3 Synvisc injection today    History of present illness: Patient presents for his first Synvisc injection to the left knee today.  At this time the patient denies any numbness or tingling into the distal extremity.  No fever, chills, night sweats or other constitutional symptoms.    See chart for PMH, PSH, Meds, All - reviewed.    Ortho exam:  Left knee  Skin is intact without redness, warmth or swelling/effusion.  No lesions or evidence of infection noted.  Motor/sensory: Grossly intact L2-S1.    Assessment/plan:  Left knee osteoarthritis    Proceed today with 1/3 of Synvisc injection.  Patient will follow-up in week for second injection.      After discussing risks of injection the patient gave consent to proceed.  His left knee was confirmed as the correct joint to be injected with a timeout.  The knee was then prepped with Hibiclens and injected with a prefilled syringe of Synvisc without any resistance using anterior lateral approach, patient in seated position.  The patient tolerated procedure well.  Hemostasis was achieved and a Band-Aid was applied over the injection site.  I instructed the patient on signs and symptoms of infection.  They should report to the ED if any of these develop.  Recommended modifying activity to include rest, ice, elevation and/or heat along with oral pain medication as needed.

## 2022-10-24 NOTE — PROGRESS NOTES
Procedure   Large Joint Arthrocentesis: L knee  Date/Time: 10/24/2022 11:36 AM  Consent given by: patient  Site marked: site marked  Timeout: Immediately prior to procedure a time out was called to verify the correct patient, procedure, equipment, support staff and site/side marked as required   Supporting Documentation  Indications: pain   Procedure Details  Location: knee - L knee  Preparation: Patient was prepped and draped in the usual sterile fashion  Needle size: 22 G  Approach: anterolateral  Medications administered: 8 mg/mL Hylan 16 MG/2ML  Patient tolerance: patient tolerated the procedure well with no immediate complications

## 2022-10-31 ENCOUNTER — OFFICE VISIT (OUTPATIENT)
Dept: ORTHOPEDIC SURGERY | Facility: CLINIC | Age: 72
End: 2022-10-31

## 2022-10-31 DIAGNOSIS — M17.12 PRIMARY OSTEOARTHRITIS OF LEFT KNEE: Primary | ICD-10-CM

## 2022-10-31 PROCEDURE — 20610 DRAIN/INJ JOINT/BURSA W/O US: CPT | Performed by: PHYSICIAN ASSISTANT

## 2022-10-31 NOTE — PROGRESS NOTES
Procedure   - Large Joint Arthrocentesis: L knee on 10/31/2022 10:25 AM  Indications: pain  Details: 22 G needle, anterolateral approach  Medications: 8 mg/mL Hylan 16 MG/2ML  Outcome: tolerated well, no immediate complications  Procedure, treatment alternatives, risks and benefits explained, specific risks discussed. Consent was given by the patient. Immediately prior to procedure a time out was called to verify the correct patient, procedure, equipment, support staff and site/side marked as required. Patient was prepped and draped in the usual sterile fashion.

## 2022-10-31 NOTE — PROGRESS NOTES
CC: Follow-up left knee osteoarthritis, 2/3 Synvisc injection today     History of present illness: Patient presents for his second Synvisc injection to the left knee today.  At this time the patient denies any numbness or tingling into the distal extremity.  No fever, chills, night sweats or other constitutional symptoms.     See chart for PMH, PSH, Meds, All - reviewed.     Ortho exam:  Left knee  Skin is intact without redness, warmth or swelling/effusion.  No lesions or evidence of infection noted.  Motor/sensory: Grossly intact L2-S1.     Assessment/plan:  Left knee osteoarthritis     Proceed today with 2/3 of Synvisc injection.  Patient will follow-up in week for third injection.       After discussing risks of injection the patient gave consent to proceed.  His left knee was confirmed as the correct joint to be injected with a timeout.  The knee was then prepped with Hibiclens and injected with a prefilled syringe of Synvisc without any resistance using anterior lateral approach, patient in seated position.  The patient tolerated procedure well.  Hemostasis was achieved and a Band-Aid was applied over the injection site.  I instructed the patient on signs and symptoms of infection.  They should report to the ED if any of these develop.  Recommended modifying activity to include rest, ice, elevation and/or heat along with oral pain medication as needed.

## 2022-11-07 ENCOUNTER — CLINICAL SUPPORT (OUTPATIENT)
Dept: ORTHOPEDIC SURGERY | Facility: CLINIC | Age: 72
End: 2022-11-07

## 2022-11-07 DIAGNOSIS — M17.12 PRIMARY OSTEOARTHRITIS OF LEFT KNEE: Primary | ICD-10-CM

## 2022-11-07 PROCEDURE — 20610 DRAIN/INJ JOINT/BURSA W/O US: CPT | Performed by: PHYSICIAN ASSISTANT

## 2022-11-07 NOTE — PROGRESS NOTES
CC: Follow-up left knee osteoarthritis, 3/3 Synvisc injection today     History of present illness: Patient presents for his third Synvisc injection to the left knee today.  At this time the patient denies any numbness or tingling into the distal extremity.  No fever, chills, night sweats or other constitutional symptoms.    Continues to note improvement after the first 2 injections.     See chart for PMH, PSH, Meds, All - reviewed.     Ortho exam:  Left knee  Skin is intact without redness, warmth or swelling/effusion.  No lesions or evidence of infection noted.  Motor/sensory: Grossly intact L2-S1.     Assessment/plan:  Left knee osteoarthritis     Proceed today with 3/3 of Synvisc injection.  Patient will follow-up 6 months +1-day for repeat viscosupplementation (appointment after 5/8/2023).  Patient understands that he needs to contact the clinic 1 to 2 weeks prior to that appointment so preauthorization can be placed.       After discussing risks of injection the patient gave consent to proceed.  His left knee was confirmed as the correct joint to be injected with a timeout.  The knee was then prepped with Hibiclens and injected with a prefilled syringe of Synvisc without any resistance using anterior lateral approach, patient in seated position.  The patient tolerated procedure well.  Hemostasis was achieved and a Band-Aid was applied over the injection site.  I instructed the patient on signs and symptoms of infection.  They should report to the ED if any of these develop.  Recommended modifying activity to include rest, ice, elevation and/or heat along with oral pain medication as needed.

## 2022-11-07 NOTE — PROGRESS NOTES
Procedure   Large Joint Arthrocentesis: L knee  Date/Time: 11/7/2022 1:30 PM  Consent given by: patient  Site marked: site marked  Timeout: Immediately prior to procedure a time out was called to verify the correct patient, procedure, equipment, support staff and site/side marked as required   Supporting Documentation  Indications: pain   Procedure Details  Location: knee - L knee  Preparation: Patient was prepped and draped in the usual sterile fashion  Needle size: 23 G  Approach: anterolateral  Medications administered: 8 mg/mL Hylan 16 MG/2ML  Patient tolerance: patient tolerated the procedure well with no immediate complications

## 2022-12-05 ENCOUNTER — OFFICE VISIT (OUTPATIENT)
Dept: ORTHOPEDIC SURGERY | Facility: CLINIC | Age: 72
End: 2022-12-05

## 2022-12-05 VITALS — DIASTOLIC BLOOD PRESSURE: 89 MMHG | SYSTOLIC BLOOD PRESSURE: 159 MMHG

## 2022-12-05 DIAGNOSIS — M16.11 PRIMARY OSTEOARTHRITIS OF RIGHT HIP: Primary | ICD-10-CM

## 2022-12-05 PROCEDURE — 99213 OFFICE O/P EST LOW 20 MIN: CPT | Performed by: ORTHOPAEDIC SURGERY

## 2022-12-05 NOTE — PROGRESS NOTES
[facility] [place]    Subjective     Chief Complaint   Patient presents with   • Follow-up     4 month recheck - Primary osteoarthritis of right hip- Fl guided hip injection 8/3/22        HPI    Dewey Avalos is a 72 y.o. male who presents with new problem of: right hip arthritis.  Onset: atraumatic and gradual in nature. The issue has been ongoing for 11 month(s). Pain is a 5/10 on the pain scale. Pain is described as aching. Associated symptoms include pain and stiffness. The pain is worse with walking, climbing stairs and lying on affected side; resting improve the pain. Previous treatments have included: NSAIDS.  Previous hip injection in August 2022, which helped significantly initially, and is still providing some relief.  He would like to discuss other options for his hip.    I have reviewed the following portions of the patient's history and agree with: History of Present Illness and Review of Systems    There is no problem list on file for this patient.    Past Medical History:   Diagnosis Date   • Ankle sprain     Numerous sprains over the years.   • Arthritis    • Arthritis of back 2012   • Back problem    • Hip arthrosis 2013   • Hypertension    • Knee swelling 2014   • Lumbosacral disc disease 2018   • Periarthritis of shoulder 2013   • Plantar fasciitis       Past Surgical History:   Procedure Laterality Date   • KNEE SURGERY Left 03/28/2014    Deshawn Proctor in [place] performed knee scope      Family History   Problem Relation Age of Onset   • Arthritis Mother    • Heart disease Father    • Arthritis Father    • Diabetes Brother    • Cancer Maternal Grandmother    • Diabetes Paternal Grandfather    • Diabetes Brother      Social History     Socioeconomic History   • Marital status:    Tobacco Use   • Smoking status: Never   • Smokeless tobacco: Never   Substance and Sexual Activity   • Alcohol use: Yes     Alcohol/week: 12.0 standard drinks     Types: 12 Cans of  beer per week     Comment: 12 drinks/week   • Drug use: Never   • Sexual activity: Yes     Partners: Female     Birth control/protection: None      Current Outpatient Medications on File Prior to Visit   Medication Sig Dispense Refill   • aspirin 81 MG chewable tablet Chew 81 mg Daily.     • doxazosin (CARDURA) 2 MG tablet      • famotidine (PEPCID) 40 MG tablet Take 1 tablet by mouth Daily.     • losartan-hydrochlorothiazide (HYZAAR) 100-25 MG per tablet Take 1 tablet by mouth Daily.     • multivitamin with minerals tablet tablet Take 1 tablet by mouth Daily.     • pantoprazole (PROTONIX) 40 MG EC tablet Take 1 tablet by mouth Daily.       No current facility-administered medications on file prior to visit.      No Known Allergies     Review of Systems   Constitutional: Negative for activity change, appetite change, chills, diaphoresis, fatigue, fever and unexpected weight change.   HENT: Negative for congestion, dental problem, drooling, ear discharge, ear pain, facial swelling, hearing loss, mouth sores, nosebleeds, postnasal drip, rhinorrhea, sinus pressure, sneezing, sore throat, tinnitus, trouble swallowing and voice change.    Eyes: Negative for photophobia, pain, discharge, redness, itching and visual disturbance.   Respiratory: Negative for apnea, cough, choking, chest tightness, shortness of breath, wheezing and stridor.    Cardiovascular: Negative for chest pain, palpitations and leg swelling.   Gastrointestinal: Negative for abdominal distention, abdominal pain, anal bleeding, blood in stool, constipation, diarrhea, nausea, rectal pain and vomiting.   Endocrine: Negative for cold intolerance, heat intolerance, polydipsia, polyphagia and polyuria.   Genitourinary: Negative for decreased urine volume, difficulty urinating, dysuria, enuresis, flank pain, frequency, genital sores, hematuria and urgency.   Musculoskeletal: Positive for arthralgias. Negative for back pain, gait problem, joint swelling,  myalgias, neck pain and neck stiffness.   Skin: Negative for color change, pallor, rash and wound.   Allergic/Immunologic: Negative for environmental allergies, food allergies and immunocompromised state.   Neurological: Negative for dizziness, tremors, seizures, syncope, facial asymmetry, speech difficulty, weakness, light-headedness, numbness and headaches.   Hematological: Negative for adenopathy. Does not bruise/bleed easily.   Psychiatric/Behavioral: Negative for agitation, behavioral problems, confusion, decreased concentration, dysphoric mood, hallucinations, self-injury, sleep disturbance and suicidal ideas. The patient is not nervous/anxious and is not hyperactive.         Objective      Physical Exam  /89     There is no height or weight on file to calculate BMI.    General:   Mental Status:  Alert   Appearance: Cooperative, in no acute distress   Build and Nutrition: Well-nourished well-developed male   Orientation: Alert and oriented to person, place and time   Posture: Normal   Gait: Mild limp on the right    Integument:   Right hip: No skin lesions, no rash, no ecchymosis    Lower Extremity:   Right Hip:    Tenderness:  None    Swelling:  None    Crepitus:  None    Range of motion:  External Rotation: 30°       Internal Rotation: 20°       Flexion:  100°       Extension:  0°    Deformities:  None  Functional testing: Positive [facility]    No leg length discrepancy      Imaging/Studies      Imaging Results (Last 24 Hours)     ** No results found for the last 24 hours. **        3/24/2022  Indication: Right hip pain     Comparison: No prior xrays are available for comparison     IMPRESSION:      AP pelvis, right hip: moderate joint space narrowing,  there are marginal osteophytes visualized at the femoral head-neck junction and acetabular margins  AP pelvis, left hip: moderate joint space narrowing,  there are marginal osteophytes visualized at the femoral head-neck junction and acetabular  margins    I reviewed the above imaging, and agree with findings.    Assessment and Plan     Diagnoses and all orders for this visit:    1. Primary osteoarthritis of right hip (Primary)        1. Primary osteoarthritis of right hip        I reviewed my findings with the patient.  He does have advanced right hip arthritis, we discussed treatment options for the future.  He is a candidate for hip replacement surgery if and when his symptoms warrant.  We would like to get his many years out of his native hip as we can.  We will see him back in 3 months with a new x-ray, but I will be happy to see him back sooner for any worsening or problems.    Return in about 3 months (around 3/5/2023) for Recheck with X-Rays.      Damien Ugalde MD  12/05/22  09:23 EST

## 2022-12-19 ENCOUNTER — TELEPHONE (OUTPATIENT)
Dept: ORTHOPEDIC SURGERY | Facility: CLINIC | Age: 72
End: 2022-12-19

## 2023-02-13 ENCOUNTER — OFFICE VISIT (OUTPATIENT)
Dept: ORTHOPEDIC SURGERY | Facility: CLINIC | Age: 73
End: 2023-02-13
Payer: MEDICARE

## 2023-02-13 VITALS
DIASTOLIC BLOOD PRESSURE: 80 MMHG | SYSTOLIC BLOOD PRESSURE: 136 MMHG | BODY MASS INDEX: 23.99 KG/M2 | WEIGHT: 203.2 LBS | HEIGHT: 77 IN

## 2023-02-13 DIAGNOSIS — M16.11 PRIMARY OSTEOARTHRITIS OF RIGHT HIP: Primary | ICD-10-CM

## 2023-02-13 PROCEDURE — 99214 OFFICE O/P EST MOD 30 MIN: CPT | Performed by: ORTHOPAEDIC SURGERY

## 2023-02-13 RX ORDER — MELOXICAM 7.5 MG/1
15 TABLET ORAL ONCE
Status: CANCELLED | OUTPATIENT
Start: 2023-02-13 | End: 2023-02-13

## 2023-02-13 RX ORDER — PREGABALIN 150 MG/1
150 CAPSULE ORAL ONCE
Status: CANCELLED | OUTPATIENT
Start: 2023-02-13 | End: 2023-02-13

## 2023-02-13 RX ORDER — ACETAMINOPHEN 325 MG/1
1000 TABLET ORAL ONCE
Status: CANCELLED | OUTPATIENT
Start: 2023-02-13 | End: 2023-02-13

## 2023-02-13 NOTE — PROGRESS NOTES
[facility]    Subjective     Chief Complaint   Patient presents with   • Follow-up     2 month follow up - Primary osteoarthritis of right hip         HPI    It has been 2  month(s) since Mr. Avalos's last visit. He returns to clinic today for follow-up of right knee pain. The issue has been ongoing for 1 year(s). He rates his pain a 5/10 on the pain scale. Previous/current treatments: NSAIDS. Current symptoms: same as prior visit. The pain is worse with walking, standing and climbing stairs; resting improve the pain. Overall, he is doing the same.  He has reached a point where he would like to proceed with right total hip arthroplasty surgery.  He had good brief relief with an intra-articular hip injection previously.  No history of clots or clotting disorders.  No diabetes or heart disease.  His wife is able to help out postoperatively.  Hip affects his activities of daily living.    I have reviewed the following portions of the patient's history and agree with: History of Present Illness and Review of Systems    Patient Active Problem List   Diagnosis   • Primary osteoarthritis of right hip     Past Medical History:   Diagnosis Date   • Ankle sprain     Numerous sprains over the years.   • Arthritis    • Arthritis of back 2012   • Back problem    • Hip arthrosis 2013   • Hypertension    • Knee swelling 2014   • Lumbosacral disc disease 2018   • Periarthritis of shoulder 2013   • Plantar fasciitis       Past Surgical History:   Procedure Laterality Date   • CHOLECYSTECTOMY  01/23/2023   • KNEE SURGERY Left 03/28/2014    Deshawn Proctor in [place] performed knee scope      Family History   Problem Relation Age of Onset   • Arthritis Mother    • Heart disease Father    • Arthritis Father    • Diabetes Brother    • Cancer Maternal Grandmother    • Diabetes Paternal Grandfather    • Diabetes Brother      Social History     Socioeconomic History   • Marital status:     Tobacco Use   • Smoking status: Never   • Smokeless tobacco: Never   Substance and Sexual Activity   • Alcohol use: Yes     Alcohol/week: 12.0 standard drinks     Types: 12 Cans of beer per week     Comment: 12 drinks/week   • Drug use: Never   • Sexual activity: Yes     Partners: Female     Birth control/protection: None      Current Outpatient Medications on File Prior to Visit   Medication Sig Dispense Refill   • aspirin 81 MG chewable tablet Chew 81 mg Daily.     • doxazosin (CARDURA) 2 MG tablet      • famotidine (PEPCID) 40 MG tablet Take 1 tablet by mouth Daily.     • losartan-hydrochlorothiazide (HYZAAR) 100-25 MG per tablet Take 1 tablet by mouth Daily.     • multivitamin with minerals tablet tablet Take 1 tablet by mouth Daily.     • pantoprazole (PROTONIX) 40 MG EC tablet Take 1 tablet by mouth Daily.       No current facility-administered medications on file prior to visit.      No Known Allergies     Review of Systems   Constitutional: Negative for activity change, appetite change, chills, diaphoresis, fatigue, fever and unexpected weight change.   HENT: Negative for congestion, dental problem, drooling, ear discharge, ear pain, facial swelling, hearing loss, mouth sores, nosebleeds, postnasal drip, rhinorrhea, sinus pressure, sneezing, sore throat, tinnitus, trouble swallowing and voice change.    Eyes: Negative for photophobia, pain, discharge, redness, itching and visual disturbance.   Respiratory: Negative for apnea, cough, choking, chest tightness, shortness of breath, wheezing and stridor.    Cardiovascular: Negative for chest pain, palpitations and leg swelling.   Gastrointestinal: Negative for abdominal distention, abdominal pain, anal bleeding, blood in stool, constipation, diarrhea, nausea, rectal pain and vomiting.   Endocrine: Negative for cold intolerance, heat intolerance, polydipsia, polyphagia and polyuria.   Genitourinary: Negative for decreased urine volume, difficulty urinating,  "dysuria, enuresis, flank pain, frequency, genital sores, hematuria and urgency.   Musculoskeletal: Positive for arthralgias. Negative for back pain, gait problem, joint swelling, myalgias, neck pain and neck stiffness.   Skin: Negative for color change, pallor, rash and wound.   Allergic/Immunologic: Negative for environmental allergies, food allergies and immunocompromised state.   Neurological: Negative for dizziness, tremors, seizures, syncope, facial asymmetry, speech difficulty, weakness, light-headedness, numbness and headaches.   Hematological: Negative for adenopathy. Does not bruise/bleed easily.   Psychiatric/Behavioral: Negative for agitation, behavioral problems, confusion, decreased concentration, dysphoric mood, hallucinations, self-injury, sleep disturbance and suicidal ideas. The patient is not nervous/anxious and is not hyperactive.    All other systems reviewed and are negative.       Objective      Physical Exam  /80   Ht 195.6 cm (77\")   Wt 92.2 kg (203 lb 3.2 oz)   BMI 24.10 kg/m²     Body mass index is 24.1 kg/m².     General:   Mental Status:  Alert   Appearance: Cooperative, in no acute distress   Build and Nutrition: Well-nourished well-developed male   Orientation: Alert and oriented to person, place and time   Posture: Normal   Gait: Nonantalgic     Lower Extremity:              Right Hip:                          Tenderness:    None                          Swelling:          None                          Crepitus:          None                          Range of motion:        External Rotation:       30°                                                              Internal Rotation:        20°                                                              Flexion:                       100°                                                              Extension:                   0°                       Deformities:     None  Functional testing: Positive [place]                "           No leg length discrepancy    Imaging/Studies  Imaging Results (Last 24 Hours)     ** No results found for the last 24 hours. **            Assessment and Plan     Diagnoses and all orders for this visit:    1. Primary osteoarthritis of right hip (Primary)  -     XR Hip With or Without Pelvis 2 - 3 View Right; Future  -     Case Request; Standing  -     Instructions on coughing, deep breathing, and incentive spirometry.; Future  -     CBC and Differential; Future  -     Basic metabolic panel; Future  -     Protime-INR; Future  -     APTT; Future  -     Hemoglobin A1c; Future  -     Sedimentation rate; Future  -     C-reactive protein; Future  -     Tranexamic Acid 1,000 mg in sodium chloride 0.9 % 100 mL  -     Tranexamic Acid 1,000 mg in sodium chloride 0.9 % 100 mL  -     ethyl alcohol 62 % 2 each  -     ceFAZolin (ANCEF) 2 g in sodium chloride 0.9 % 100 mL IVPB  -     acetaminophen (TYLENOL) tablet 975 mg  -     meloxicam (MOBIC) tablet 15 mg  -     pregabalin (LYRICA) capsule 150 mg  -     Case Request    Other orders  -     Follow Anesthesia Guidelines / Protocol; Future  -     Obtain informed consent  -     Provide instructions to patient regarding NPO status  -     Chlorhexidine Skin Prep - Educate and Review With Patient; Future  -     Provide Patient With ERAS Hydration Instructions  -     Provide Patient With Enhanced Recovery Booklet(s) or Handout  -     Provide Instructions/Handout For Benzoyl Peroxide 5% Wash If Having Shoulder/Arm Surgery (If Prescribed)  -     Provide Instructions/Handout For Bactroban And Chlorhexidine Shower (If Prescribed)  -     Perform A Memory Screening On All Hip/Knee Replacement Patients >Or Equal To 65 Years Or Older  -     Complete A PROMIS And HOOS Or KOOS Survey If Having Hip Or Knee Replacement  -     Follow Anesthesia Guidelines / Protocol; Standing  -     Verify NPO Status; Standing  -     Verify The Time Patient Completed ERAS Hydration Drink; Standing  -      SCD (sequential compression device)- to be placed on patient in Pre-op; Standing  -     Clip operative site; Standing  -     Obtain informed consent (if not collected inpatient or PAT); Standing  -     Notify Physician - Standard; Standing  -     Provide Patient With Carbo Loading Instructions  -     Provide Patient With ERAS Booklet(s)/Handout  -     Outpatient In A Bed; Standing  -     chlorhexidine (HIBICLENS) 4 % external liquid; Apply  topically to the appropriate area as directed Daily. Shower with hibiclens solution as directed for 5 days prior to surgery  Dispense: 236 mL; Refill: 0        1. Primary osteoarthritis of right hip        I reviewed my findings with the patient.  We discussed treatment options for his right hip going forward, and he is interested in proceeding with right total hip arthroplasty surgery.  We discussed the risk, benefits, and alternatives.  He understands, consents, and his questions were answered.  Please see my counseling note for details.    Surgical Counseling     I have informed the patient of the diagnosis and the prognosis.  Exhaustive conservative treatment modalities have not resulted in long term pain relief.  The symptoms have progressed to the point of daily pain and inability to perform activities of daily living without significant pain.  The patient has reached the point of desiring to proceed with total hip arthroplasty after discussing the risks, benefits and alternatives to the procedure.  The surgical procedure itself was discussed in detail.  Risks of the procedure were discussed, which included but are not limited to, bleeding, infection, damage to blood vessels and nerves, incomplete pain relief, loosening of the prosthesis (early or late), deep infection (early or late), need for further surgery, leg length discrepancy, hip dislocation, loss of limb, deep venous thrombosis, pulmonary embolus, death, heart attack, stroke, kidney failure, liver failure, and  anesthetic complications.  In addition, the potential for deep infection developing in the future was discussed, which could require further surgery.  The hip would have to be re-opened, debrided, and potentially remove the prosthesis, which may or may not be replaced in the future.  Also, the possibility for loosening of the prosthesis has been mentioned.  If the prosthesis loosened, a revision arthroplasty could be performed, with results that are not as predictable compared to the original procedure.  The typical rehabilitative course has also been discussed, and full recovery may take up to a year to see the maximum benefit.  The importance of patient cooperation in the rehabilitative efforts has also been discussed.  No guarantees were given.  The patient understands the potential risks versus the benefits and desires to proceed with total hip arthroplasty at a mutually convenient time.    Return for surgery.      Damien Ugalde MD  02/13/23  13:34 EST

## 2023-04-24 ENCOUNTER — TELEPHONE (OUTPATIENT)
Dept: ORTHOPEDIC SURGERY | Facility: CLINIC | Age: 73
End: 2023-04-24
Payer: MEDICARE

## 2023-04-24 DIAGNOSIS — M17.12 PRIMARY OSTEOARTHRITIS OF LEFT KNEE: Primary | ICD-10-CM

## 2023-04-24 NOTE — TELEPHONE ENCOUNTER
LVM to let him know we got the referral placed and they will be calling to get him scheduled. I told him if he has any other questions he can call our office back.    Peg Reeves

## 2023-04-24 NOTE — TELEPHONE ENCOUNTER
Patient called and reports that [employer] could put in a request 2 weeks before his appointment to have the injections approved. You can call him back at [phone]

## 2023-04-24 NOTE — TELEPHONE ENCOUNTER
Preauthorization for viscosupplementation series placed.    If approved patient will be able to start series on or after 5/8/2023.

## 2023-05-08 ENCOUNTER — CLINICAL SUPPORT (OUTPATIENT)
Dept: ORTHOPEDIC SURGERY | Facility: CLINIC | Age: 73
End: 2023-05-08
Payer: MEDICARE

## 2023-05-08 DIAGNOSIS — M17.12 PRIMARY OSTEOARTHRITIS OF LEFT KNEE: Primary | ICD-10-CM

## 2023-05-08 RX ORDER — NAPROXEN SODIUM 220 MG
220 TABLET ORAL 2 TIMES DAILY PRN
COMMUNITY

## 2023-05-08 NOTE — PROGRESS NOTES
Procedure   Large Joint Arthrocentesis: L knee  Date/Time: 5/8/2023 8:59 AM  Consent given by: patient  Site marked: site marked  Timeout: Immediately prior to procedure a time out was called to verify the correct patient, procedure, equipment, support staff and site/side marked as required   Supporting Documentation  Indications: pain   Procedure Details  Location: knee - L knee  Preparation: Patient was prepped and draped in the usual sterile fashion  Needle gauge: 21g.  Approach: anterolateral  Medications administered: 8 mg/mL Hylan 16 MG/2ML  Patient tolerance: patient tolerated the procedure well with no immediate complications

## 2023-05-08 NOTE — PROGRESS NOTES
CC: Follow-up left knee osteoarthritis, 1/3 Synvisc injection today.     History of present illness: Patient presents for his first Synvisc injection to the left knee today.  At this time the patient denies any numbness or tingling into the distal extremity.  No fever, chills, night sweats or other constitutional symptoms.     See chart for PMH, PSH, Meds, All - reviewed.     Ortho exam:  Left knee  Skin is intact without redness, warmth or swelling/effusion.  No lesions or evidence of infection noted.  Motor/sensory: Grossly intact L2-S1.     Assessment/plan:  Left knee osteoarthritis     Proceed today with 1/3 of Synvisc injection.  Patient will follow-up in week for second injection.       After discussing risks of injection the patient gave consent to proceed.  His left knee was confirmed as the correct joint to be injected with a timeout.  The knee was then prepped with Hibiclens and injected with a prefilled syringe of Synvisc without any resistance using anterior lateral approach, patient in seated position.  The patient tolerated procedure well.  Hemostasis was achieved and a Band-Aid was applied over the injection site.  I instructed the patient on signs and symptoms of infection.  They should report to the ED if any of these develop.  Recommended modifying activity to include rest, ice, elevation and/or heat along with oral pain medication as needed.

## 2023-05-11 ENCOUNTER — TELEPHONE (OUTPATIENT)
Dept: ORTHOPEDIC SURGERY | Facility: CLINIC | Age: 73
End: 2023-05-11
Payer: MEDICARE

## 2023-05-11 NOTE — TELEPHONE ENCOUNTER
I called patient, he has experienced some pain in the back of the knee as well as swelling in the knee after his Synvisc injection on Monday. I explained that increased pain can be normal and we recommend the RICE method to help with that. He has been elevating and noticed it was a bit better. I explained how to properly elevate the leg and to also use ice and compression along with that. Injection site is not red or hot. He will keep his appointment for next week for his 2nd shot and I assured him Leticia would evaluate the knee prior to the injection to make sure all is ok. He understood and will call back with any further questions or concerns he may have.  Fabienne Camacho RT (R), ROT

## 2023-05-11 NOTE — TELEPHONE ENCOUNTER
Patient called and reports that he is having swelling in his left knee after he received a gel injection on 5/8/23. You can call him back at [phone]

## 2023-05-15 ENCOUNTER — CLINICAL SUPPORT (OUTPATIENT)
Dept: ORTHOPEDIC SURGERY | Facility: CLINIC | Age: 73
End: 2023-05-15
Payer: MEDICARE

## 2023-05-15 DIAGNOSIS — M17.12 PRIMARY OSTEOARTHRITIS OF LEFT KNEE: Primary | ICD-10-CM

## 2023-05-15 NOTE — PROGRESS NOTES
CC: Follow-up left knee osteoarthritis, 2/3 Synvisc injection today.     History of present illness: Patient presents for his second Synvisc injection to the left knee today.      Patient reports that after his initial injection last week he noted increased pain in the medial and lateral aspects of the knee as well as swelling.  He also went for a walk that afternoon.  He then spent the next couple of days icing and elevating which have helped.      After discussing with the patient he has opted to proceed on with injection #2 of Synvisc.      At this time the patient denies any numbness or tingling into the distal extremity.  No fever, chills, night sweats or other constitutional symptoms.     See chart for PMH, PSH, Meds, All - reviewed.     Ortho exam:  Left knee  Skin is intact without redness, warmth.  Trace effusion.  No lesions or evidence of infection noted.  Motor/sensory: Grossly intact L2-S1.     Assessment/plan:  Left knee osteoarthritis     Proceed today with 2/3 of Synvisc injection.  Patient will follow-up in week for third injection.       After discussing risks of injection the patient gave consent to proceed.  His left knee was confirmed as the correct joint to be injected with a timeout.  The knee was then prepped with Hibiclens and injected with a prefilled syringe of Synvisc without any resistance using anterior lateral approach, patient in seated position.  The patient tolerated procedure well.  Hemostasis was achieved and a Band-Aid was applied over the injection site.  I instructed the patient on signs and symptoms of infection.  They should report to the ED if any of these develop.  Recommended modifying activity to include rest, ice, elevation and/or heat along with oral pain medication as needed.

## 2023-05-15 NOTE — PROGRESS NOTES
Procedure   Large Joint Arthrocentesis: L knee  Date/Time: 5/15/2023 10:12 AM  Consent given by: patient  Site marked: site marked  Timeout: Immediately prior to procedure a time out was called to verify the correct patient, procedure, equipment, support staff and site/side marked as required   Supporting Documentation  Indications: pain   Procedure Details  Location: knee - L knee  Preparation: Patient was prepped and draped in the usual sterile fashion  Needle gauge: 21g.  Approach: anterolateral  Medications administered: 8 mg/mL Hylan 16 MG/2ML  Patient tolerance: patient tolerated the procedure well with no immediate complications

## 2023-05-22 ENCOUNTER — CLINICAL SUPPORT (OUTPATIENT)
Dept: ORTHOPEDIC SURGERY | Facility: CLINIC | Age: 73
End: 2023-05-22
Payer: MEDICARE

## 2023-05-22 DIAGNOSIS — M17.12 PRIMARY OSTEOARTHRITIS OF LEFT KNEE: Primary | ICD-10-CM

## 2023-05-22 NOTE — PROGRESS NOTES
Procedure   - Large Joint Arthrocentesis: L knee on 5/22/2023 9:10 AM  Indications: pain  Details: 21 G needle, anterolateral approach  Medications: 8 mg/mL Hylan 16 MG/2ML  Outcome: tolerated well, no immediate complications  Procedure, treatment alternatives, risks and benefits explained, specific risks discussed. Consent was given by the patient. Immediately prior to procedure a time out was called to verify the correct patient, procedure, equipment, support staff and site/side marked as required. Patient was prepped and draped in the usual sterile fashion.

## 2023-05-22 NOTE — PROGRESS NOTES
CC: Follow-up left knee osteoarthritis, 3/3 Synvisc injection today.     History of present illness: Patient presents for his third Synvisc injection to the left knee today. At this time the patient denies any numbness or tingling into the distal extremity.  No fever, chills, night sweats or other constitutional symptoms.     See chart for PMH, PSH, Meds, All - reviewed.     Ortho exam:  Left knee  Skin is intact without redness, warmth.  Trace effusion.  No lesions or evidence of infection noted.  Motor/sensory: Grossly intact L2-S1.     Assessment/plan:  Left knee osteoarthritis     Proceed today with 3/3 of Synvisc injection.  Patient will follow-up 5 months for repeat exam and imaging if he wants repeat visco injections--preauthorization can placed.       After discussing risks of injection the patient gave consent to proceed.  His left knee was confirmed as the correct joint to be injected with a timeout.  The knee was then prepped with Hibiclens and injected with a prefilled syringe of Synvisc without any resistance using anterior lateral approach, patient in seated position.  The patient tolerated procedure well.  Hemostasis was achieved and a Band-Aid was applied over the injection site.  I instructed the patient on signs and symptoms of infection.  They should report to the ED if any of these develop.  Recommended modifying activity to include rest, ice, elevation and/or heat along with oral pain medication as needed.

## 2023-06-01 ENCOUNTER — PRE-ADMISSION TESTING (OUTPATIENT)
Dept: PREADMISSION TESTING | Facility: HOSPITAL | Age: 73
End: 2023-06-01
Payer: MEDICARE

## 2023-06-01 ENCOUNTER — TELEPHONE (OUTPATIENT)
Dept: ORTHOPEDIC SURGERY | Facility: CLINIC | Age: 73
End: 2023-06-01

## 2023-06-01 VITALS — HEIGHT: 77 IN | BODY MASS INDEX: 25.45 KG/M2 | WEIGHT: 215.5 LBS

## 2023-06-01 DIAGNOSIS — M16.11 PRIMARY OSTEOARTHRITIS OF RIGHT HIP: ICD-10-CM

## 2023-06-01 LAB
ANION GAP SERPL CALCULATED.3IONS-SCNC: 14 MMOL/L (ref 5–15)
APTT PPP: 31 SECONDS (ref 22–39)
BASOPHILS # BLD AUTO: 0.02 10*3/MM3 (ref 0–0.2)
BASOPHILS NFR BLD AUTO: 0.2 % (ref 0–1.5)
BUN SERPL-MCNC: 14 MG/DL (ref 8–23)
BUN/CREAT SERPL: 15.9 (ref 7–25)
CALCIUM SPEC-SCNC: 9.2 MG/DL (ref 8.6–10.5)
CHLORIDE SERPL-SCNC: 102 MMOL/L (ref 98–107)
CO2 SERPL-SCNC: 23 MMOL/L (ref 22–29)
CREAT SERPL-MCNC: 0.88 MG/DL (ref 0.76–1.27)
CRP SERPL-MCNC: 2.22 MG/DL (ref 0–0.5)
DEPRECATED RDW RBC AUTO: 42.6 FL (ref 37–54)
EGFRCR SERPLBLD CKD-EPI 2021: 91.4 ML/MIN/1.73
EOSINOPHIL # BLD AUTO: 0.13 10*3/MM3 (ref 0–0.4)
EOSINOPHIL NFR BLD AUTO: 1.3 % (ref 0.3–6.2)
ERYTHROCYTE [DISTWIDTH] IN BLOOD BY AUTOMATED COUNT: 13 % (ref 12.3–15.4)
ERYTHROCYTE [SEDIMENTATION RATE] IN BLOOD: 23 MM/HR (ref 0–20)
GLUCOSE SERPL-MCNC: 109 MG/DL (ref 65–99)
HBA1C MFR BLD: 5.5 % (ref 4.8–5.6)
HCT VFR BLD AUTO: 44.7 % (ref 37.5–51)
HGB BLD-MCNC: 14.7 G/DL (ref 13–17.7)
IMM GRANULOCYTES # BLD AUTO: 0.04 10*3/MM3 (ref 0–0.05)
IMM GRANULOCYTES NFR BLD AUTO: 0.4 % (ref 0–0.5)
INR PPP: 1.01 (ref 0.89–1.12)
LYMPHOCYTES # BLD AUTO: 1.14 10*3/MM3 (ref 0.7–3.1)
LYMPHOCYTES NFR BLD AUTO: 11.3 % (ref 19.6–45.3)
MCH RBC QN AUTO: 29.8 PG (ref 26.6–33)
MCHC RBC AUTO-ENTMCNC: 32.9 G/DL (ref 31.5–35.7)
MCV RBC AUTO: 90.5 FL (ref 79–97)
MONOCYTES # BLD AUTO: 1.07 10*3/MM3 (ref 0.1–0.9)
MONOCYTES NFR BLD AUTO: 10.6 % (ref 5–12)
NEUTROPHILS NFR BLD AUTO: 7.7 10*3/MM3 (ref 1.7–7)
NEUTROPHILS NFR BLD AUTO: 76.2 % (ref 42.7–76)
NRBC BLD AUTO-RTO: 0 /100 WBC (ref 0–0.2)
PLATELET # BLD AUTO: 234 10*3/MM3 (ref 140–450)
PMV BLD AUTO: 9.4 FL (ref 6–12)
POTASSIUM SERPL-SCNC: 3.7 MMOL/L (ref 3.5–5.2)
PROTHROMBIN TIME: 13.4 SECONDS (ref 12.2–14.5)
QT INTERVAL: 364 MS
QTC INTERVAL: 438 MS
RBC # BLD AUTO: 4.94 10*6/MM3 (ref 4.14–5.8)
SODIUM SERPL-SCNC: 139 MMOL/L (ref 136–145)
WBC NRBC COR # BLD: 10.1 10*3/MM3 (ref 3.4–10.8)

## 2023-06-01 PROCEDURE — 86140 C-REACTIVE PROTEIN: CPT

## 2023-06-01 PROCEDURE — 93005 ELECTROCARDIOGRAM TRACING: CPT

## 2023-06-01 PROCEDURE — 85730 THROMBOPLASTIN TIME PARTIAL: CPT

## 2023-06-01 PROCEDURE — 36415 COLL VENOUS BLD VENIPUNCTURE: CPT

## 2023-06-01 PROCEDURE — 85610 PROTHROMBIN TIME: CPT

## 2023-06-01 PROCEDURE — 85652 RBC SED RATE AUTOMATED: CPT

## 2023-06-01 PROCEDURE — 80048 BASIC METABOLIC PNL TOTAL CA: CPT

## 2023-06-01 PROCEDURE — 85025 COMPLETE CBC W/AUTO DIFF WBC: CPT

## 2023-06-01 PROCEDURE — 83036 HEMOGLOBIN GLYCOSYLATED A1C: CPT

## 2023-06-01 RX ORDER — LEVOTHYROXINE SODIUM 25 MCG
25 TABLET ORAL
COMMUNITY
Start: 2023-05-31

## 2023-06-01 RX ORDER — CETIRIZINE HYDROCHLORIDE 10 MG/1
10 TABLET ORAL DAILY
COMMUNITY

## 2023-06-01 NOTE — TELEPHONE ENCOUNTER
Patients PCP is wanting to start him RX synthroid 25mg. His SX is scheduled for 6/15/23.    When should he start the RX

## 2023-06-01 NOTE — PAT
An arrival time for procedure was not provided during PAT visit. If patient had any questions or concerns about their arrival time, they were instructed to contact their surgeon/physician.  Additionally, if the patient referred to an arrival time that was acquired from their my chart account, patient was encouraged to verify that time with their surgeon/physician. Arrival times are NOT provided in Pre Admission Testing Department.    Patient viewed general PAT education video as instructed in their preoperative information received from their surgeon.  Patient stated the general PAT education video was viewed in its entirety and survey completed.  Copies of PAT general education handouts (Incentive Spirometry, Meds to Beds Program, Patient Belongings, Pre-op skin preparation instructions, Blood Glucose testing, Visitor policy, Surgery FAQ, Code H) distributed to patient if not printed. Education related to the PAT pass and skin preparation for surgery (if applicable) completed in PAT as a reinforcement to PAT education video. Patient instructed to return PAT pass provided today as well as completed skin preparation sheet (if applicable) on the day of procedure.     Additionally if patient had not viewed video yet but intended to view it at home or in our waiting area, then referred them to the handout with QR code/link provided during PAT visit.  Instructed patient to complete survey after viewing the video in its entirety.  Encouraged patient/family to read PAT general education handouts thoroughly and notify PAT staff with any questions or concerns. Patient verbalized understanding of all information and priority content.    Patient denies any current skin issues.     Patient to apply Chlorhexadine wipes  to surgical area (as instructed) the night before procedure and the AM of procedure. Wipes provided.    Patient instructed to drink 20 ounces of Gatorade and it needs to be completed 1 hour (for Main OR patients)  or 2 hours [facility] before given arrival time for procedure (NO RED Gatorade)    Patient verbalized understanding.    Per Anesthesia Request, patient instructed not to take their ACE/ARB medications on the AM of surgery.    Post-Surgery Information Instruction Sheet given to patient during Pre-Admission Testing Visit with verbal instructions to patient to return with PAT PASS on the day of surgery. Additionally, encouraged patient to review the information provided.    Discussed with patient options for receiving total joint replacement education and assessed patient's ability and preference. Joint Replacement Guide given to patient during PAT visit since not received a copy within the last year. Encouraged patient/family to read guide thoroughly and notify PAT staff with any questions or concerns. Handout provided directing patient to links to watch online videos related to joint replacement surgery on the [employer] website. The handout gives detailed instructions for joining an online joint replacement class through Zoom or phone conference offered on . Patient agreed to participate by joining online class through Zoom. Patient verbalized understanding of instructions and to complete the online learning tool survey. Encouraged to share information with family and/or . An overview of the joint replacement education was provided during the visit including general perioperative instructions that are routine for all surgical patients (PAT PASS, wipes, directions to pre-op, etc.). PT STATED HE ALREADY DID ZOOM CLASS. PT GIVEN JOINT BOOK IN PAT.

## 2023-06-14 ENCOUNTER — ANESTHESIA EVENT (OUTPATIENT)
Dept: PERIOP | Facility: HOSPITAL | Age: 73
End: 2023-06-14
Payer: MEDICARE

## 2023-06-14 RX ORDER — FAMOTIDINE 10 MG/ML
20 INJECTION, SOLUTION INTRAVENOUS ONCE
Status: CANCELLED | OUTPATIENT
Start: 2023-06-14 | End: 2023-06-14

## 2023-06-15 ENCOUNTER — ANESTHESIA (OUTPATIENT)
Dept: PERIOP | Facility: HOSPITAL | Age: 73
End: 2023-06-15
Payer: MEDICARE

## 2023-06-15 ENCOUNTER — APPOINTMENT (OUTPATIENT)
Dept: GENERAL RADIOLOGY | Facility: HOSPITAL | Age: 73
End: 2023-06-15
Payer: MEDICARE

## 2023-06-15 ENCOUNTER — HOSPITAL ENCOUNTER (OUTPATIENT)
Facility: HOSPITAL | Age: 73
Discharge: HOME | End: 2023-06-15
Attending: ORTHOPAEDIC SURGERY | Admitting: ORTHOPAEDIC SURGERY
Payer: MEDICARE

## 2023-06-15 VITALS
OXYGEN SATURATION: 95 % | BODY MASS INDEX: 25.39 KG/M2 | WEIGHT: 215 LBS | HEART RATE: 119 BPM | SYSTOLIC BLOOD PRESSURE: 148 MMHG | RESPIRATION RATE: 18 BRPM | TEMPERATURE: 98 F | HEIGHT: 77 IN | DIASTOLIC BLOOD PRESSURE: 103 MMHG

## 2023-06-15 DIAGNOSIS — Z96.641 STATUS POST RIGHT HIP REPLACEMENT: Primary | ICD-10-CM

## 2023-06-15 PROBLEM — I10 HTN (HYPERTENSION): Status: ACTIVE | Noted: 2023-06-15

## 2023-06-15 PROBLEM — E03.9 HYPOTHYROIDISM (ACQUIRED): Status: ACTIVE | Noted: 2023-06-15

## 2023-06-15 LAB — POTASSIUM SERPL-SCNC: 3.3 MMOL/L (ref 3.5–5.2)

## 2023-06-15 PROCEDURE — 73502 X-RAY EXAM HIP UNI 2-3 VIEWS: CPT

## 2023-06-15 PROCEDURE — 25010000002 ROPIVACAINE PER 1 MG: Performed by: ORTHOPAEDIC SURGERY

## 2023-06-15 PROCEDURE — 84132 ASSAY OF SERUM POTASSIUM: CPT | Performed by: ANESTHESIOLOGY

## 2023-06-15 PROCEDURE — 97116 GAIT TRAINING THERAPY: CPT

## 2023-06-15 PROCEDURE — 63710000001 OXYCODONE 5 MG TABLET: Performed by: ORTHOPAEDIC SURGERY

## 2023-06-15 PROCEDURE — A9270 NON-COVERED ITEM OR SERVICE: HCPCS | Performed by: ORTHOPAEDIC SURGERY

## 2023-06-15 PROCEDURE — 27130 TOTAL HIP ARTHROPLASTY: CPT | Performed by: ORTHOPAEDIC SURGERY

## 2023-06-15 PROCEDURE — C1755 CATHETER, INTRASPINAL: HCPCS | Performed by: ORTHOPAEDIC SURGERY

## 2023-06-15 PROCEDURE — 63710000001 MELOXICAM 15 MG TABLET: Performed by: ORTHOPAEDIC SURGERY

## 2023-06-15 PROCEDURE — 76000 FLUOROSCOPY <1 HR PHYS/QHP: CPT

## 2023-06-15 PROCEDURE — 25010000002 DEXAMETHASONE PER 1 MG: Performed by: ANESTHESIOLOGY

## 2023-06-15 PROCEDURE — 25010000002 PROPOFOL 10 MG/ML EMULSION: Performed by: ANESTHESIOLOGY

## 2023-06-15 PROCEDURE — A9270 NON-COVERED ITEM OR SERVICE: HCPCS | Performed by: INTERNAL MEDICINE

## 2023-06-15 PROCEDURE — 25010000002 CEFAZOLIN IN DEXTROSE 2-4 GM/100ML-% SOLUTION: Performed by: ORTHOPAEDIC SURGERY

## 2023-06-15 PROCEDURE — 97161 PT EVAL LOW COMPLEX 20 MIN: CPT

## 2023-06-15 PROCEDURE — 63710000001 PREGABALIN 150 MG CAPSULE: Performed by: ORTHOPAEDIC SURGERY

## 2023-06-15 PROCEDURE — 63710000001 HYDROCHLOROTHIAZIDE 12.5 MG CAPSULE: Performed by: INTERNAL MEDICINE

## 2023-06-15 PROCEDURE — 25010000002 ONDANSETRON PER 1 MG: Performed by: NURSE ANESTHETIST, CERTIFIED REGISTERED

## 2023-06-15 PROCEDURE — 63710000001 ACETAMINOPHEN 500 MG TABLET: Performed by: ORTHOPAEDIC SURGERY

## 2023-06-15 PROCEDURE — 63710000001 LOSARTAN 50 MG TABLET: Performed by: INTERNAL MEDICINE

## 2023-06-15 PROCEDURE — C1776 JOINT DEVICE (IMPLANTABLE): HCPCS | Performed by: ORTHOPAEDIC SURGERY

## 2023-06-15 DEVICE — DEV CONTRL TISS STRATAFIX SPIRAL MNCRYL UD 3/0 PLS 60CM: Type: IMPLANTABLE DEVICE | Site: HIP | Status: FUNCTIONAL

## 2023-06-15 DEVICE — POLARSTEM COLLAR LATERAL                                    NON-CEMENTED WITH TI/HA 6
Type: IMPLANTABLE DEVICE | Site: FEMUR | Status: FUNCTIONAL
Brand: POLARSTEM

## 2023-06-15 DEVICE — REFLECTION SPHERICAL HEAD SCREW 30MM
Type: IMPLANTABLE DEVICE | Site: ACETABULUM | Status: FUNCTIONAL
Brand: REFLECTION

## 2023-06-15 DEVICE — R3 3 HOLE ACETABULAR SHELL 56MM
Type: IMPLANTABLE DEVICE | Site: ACETABULUM | Status: FUNCTIONAL
Brand: R3 ACETABULAR

## 2023-06-15 DEVICE — IMPLANTABLE DEVICE: Type: IMPLANTABLE DEVICE | Site: FEMUR | Status: FUNCTIONAL

## 2023-06-15 DEVICE — R3 US DELTA HEAD 36 +4
Type: IMPLANTABLE DEVICE | Site: FEMUR | Status: FUNCTIONAL
Brand: R3

## 2023-06-15 DEVICE — DEV CONTRL TISS STRATAFIX SYMM PDS PLUS VIL CT-1 45CM: Type: IMPLANTABLE DEVICE | Site: HIP | Status: FUNCTIONAL

## 2023-06-15 DEVICE — R3 0 DEGREE XLPE ACETABULAR LINER                                    36MM INNER DIAMETER X OUTER DIAMETER 56MM
Type: IMPLANTABLE DEVICE | Site: ACETABULUM | Status: FUNCTIONAL
Brand: R3

## 2023-06-15 RX ORDER — HYDROCHLOROTHIAZIDE 12.5 MG/1
12.5 TABLET ORAL
Status: DISCONTINUED | OUTPATIENT
Start: 2023-06-15 | End: 2023-06-15 | Stop reason: HOSPADM

## 2023-06-15 RX ORDER — FAMOTIDINE 20 MG/1
20 TABLET, FILM COATED ORAL ONCE
Status: DISCONTINUED | OUTPATIENT
Start: 2023-06-15 | End: 2023-06-15 | Stop reason: HOSPADM

## 2023-06-15 RX ORDER — ONDANSETRON 2 MG/ML
4 INJECTION INTRAMUSCULAR; INTRAVENOUS EVERY 6 HOURS PRN
Status: DISCONTINUED | OUTPATIENT
Start: 2023-06-15 | End: 2023-06-15 | Stop reason: HOSPADM

## 2023-06-15 RX ORDER — OXYCODONE HYDROCHLORIDE 5 MG/1
5 TABLET ORAL EVERY 4 HOURS PRN
Status: DISCONTINUED | OUTPATIENT
Start: 2023-06-15 | End: 2023-06-15 | Stop reason: HOSPADM

## 2023-06-15 RX ORDER — ASPIRIN 325 MG
325 TABLET, DELAYED RELEASE (ENTERIC COATED) ORAL DAILY
Qty: 30 TABLET | Refills: 0 | Status: SHIPPED | OUTPATIENT
Start: 2023-06-16 | End: 2023-07-16

## 2023-06-15 RX ORDER — MELOXICAM 15 MG/1
15 TABLET ORAL DAILY
Status: DISCONTINUED | OUTPATIENT
Start: 2023-06-15 | End: 2023-06-15 | Stop reason: HOSPADM

## 2023-06-15 RX ORDER — SODIUM CHLORIDE 9 MG/ML
120 INJECTION, SOLUTION INTRAVENOUS CONTINUOUS
Status: DISCONTINUED | OUTPATIENT
Start: 2023-06-15 | End: 2023-06-15 | Stop reason: HOSPADM

## 2023-06-15 RX ORDER — CEFAZOLIN SODIUM 2 G/100ML
2 INJECTION, SOLUTION INTRAVENOUS EVERY 8 HOURS
Status: DISCONTINUED | OUTPATIENT
Start: 2023-06-15 | End: 2023-06-15 | Stop reason: HOSPADM

## 2023-06-15 RX ORDER — NALOXONE HCL 0.4 MG/ML
0.1 VIAL (ML) INJECTION
Status: DISCONTINUED | OUTPATIENT
Start: 2023-06-15 | End: 2023-06-15 | Stop reason: HOSPADM

## 2023-06-15 RX ORDER — BUPIVACAINE HYDROCHLORIDE 5 MG/ML
INJECTION, SOLUTION PERINEURAL
Status: COMPLETED | OUTPATIENT
Start: 2023-06-15 | End: 2023-06-15

## 2023-06-15 RX ORDER — SODIUM CHLORIDE, SODIUM LACTATE, POTASSIUM CHLORIDE, CALCIUM CHLORIDE 600; 310; 30; 20 MG/100ML; MG/100ML; MG/100ML; MG/100ML
9 INJECTION, SOLUTION INTRAVENOUS CONTINUOUS
Status: DISCONTINUED | OUTPATIENT
Start: 2023-06-15 | End: 2023-06-15

## 2023-06-15 RX ORDER — ONDANSETRON 2 MG/ML
INJECTION INTRAMUSCULAR; INTRAVENOUS AS NEEDED
Status: DISCONTINUED | OUTPATIENT
Start: 2023-06-15 | End: 2023-06-15 | Stop reason: SURG

## 2023-06-15 RX ORDER — ONDANSETRON 4 MG/1
4 TABLET, FILM COATED ORAL EVERY 6 HOURS PRN
Status: DISCONTINUED | OUTPATIENT
Start: 2023-06-15 | End: 2023-06-15 | Stop reason: HOSPADM

## 2023-06-15 RX ORDER — ASPIRIN 81 MG/1
81 TABLET, CHEWABLE ORAL DAILY
Start: 2023-07-15

## 2023-06-15 RX ORDER — MAGNESIUM HYDROXIDE 1200 MG/15ML
LIQUID ORAL AS NEEDED
Status: DISCONTINUED | OUTPATIENT
Start: 2023-06-15 | End: 2023-06-15 | Stop reason: HOSPADM

## 2023-06-15 RX ORDER — PROPOFOL 10 MG/ML
VIAL (ML) INTRAVENOUS AS NEEDED
Status: DISCONTINUED | OUTPATIENT
Start: 2023-06-15 | End: 2023-06-15 | Stop reason: SURG

## 2023-06-15 RX ORDER — ACETAMINOPHEN 500 MG
1000 TABLET ORAL ONCE
Status: COMPLETED | OUTPATIENT
Start: 2023-06-15 | End: 2023-06-15

## 2023-06-15 RX ORDER — SODIUM CHLORIDE 0.9 % (FLUSH) 0.9 %
1-10 SYRINGE (ML) INJECTION AS NEEDED
Status: DISCONTINUED | OUTPATIENT
Start: 2023-06-15 | End: 2023-06-15 | Stop reason: HOSPADM

## 2023-06-15 RX ORDER — LABETALOL HYDROCHLORIDE 5 MG/ML
10 INJECTION, SOLUTION INTRAVENOUS EVERY 4 HOURS PRN
Status: DISCONTINUED | OUTPATIENT
Start: 2023-06-15 | End: 2023-06-15 | Stop reason: HOSPADM

## 2023-06-15 RX ORDER — TRANEXAMIC ACID 10 MG/ML
1000 INJECTION, SOLUTION INTRAVENOUS ONCE
Status: COMPLETED | OUTPATIENT
Start: 2023-06-15 | End: 2023-06-15

## 2023-06-15 RX ORDER — SODIUM CHLORIDE 9 MG/ML
40 INJECTION, SOLUTION INTRAVENOUS AS NEEDED
Status: DISCONTINUED | OUTPATIENT
Start: 2023-06-15 | End: 2023-06-15 | Stop reason: HOSPADM

## 2023-06-15 RX ORDER — NALOXONE HCL 0.4 MG/ML
0.4 VIAL (ML) INJECTION
Status: DISCONTINUED | OUTPATIENT
Start: 2023-06-15 | End: 2023-06-15 | Stop reason: HOSPADM

## 2023-06-15 RX ORDER — GLYCOPYRROLATE 0.2 MG/ML
INJECTION INTRAMUSCULAR; INTRAVENOUS AS NEEDED
Status: DISCONTINUED | OUTPATIENT
Start: 2023-06-15 | End: 2023-06-15 | Stop reason: SURG

## 2023-06-15 RX ORDER — MIDAZOLAM HYDROCHLORIDE 1 MG/ML
0.5 INJECTION INTRAMUSCULAR; INTRAVENOUS
Status: DISCONTINUED | OUTPATIENT
Start: 2023-06-15 | End: 2023-06-15 | Stop reason: HOSPADM

## 2023-06-15 RX ORDER — ONDANSETRON 2 MG/ML
4 INJECTION INTRAMUSCULAR; INTRAVENOUS ONCE AS NEEDED
Status: DISCONTINUED | OUTPATIENT
Start: 2023-06-15 | End: 2023-06-15 | Stop reason: HOSPADM

## 2023-06-15 RX ORDER — DOCUSATE SODIUM 100 MG/1
100 CAPSULE, LIQUID FILLED ORAL 2 TIMES DAILY
Qty: 30 CAPSULE | Refills: 0 | Status: SHIPPED | OUTPATIENT
Start: 2023-06-15 | End: 2023-06-30

## 2023-06-15 RX ORDER — ACETAMINOPHEN 500 MG
1000 TABLET ORAL EVERY 8 HOURS
Qty: 42 TABLET | Refills: 0 | Status: SHIPPED | OUTPATIENT
Start: 2023-06-15 | End: 2023-06-22

## 2023-06-15 RX ORDER — SODIUM CHLORIDE 0.9 % (FLUSH) 0.9 %
10 SYRINGE (ML) INJECTION EVERY 12 HOURS SCHEDULED
Status: DISCONTINUED | OUTPATIENT
Start: 2023-06-15 | End: 2023-06-15 | Stop reason: HOSPADM

## 2023-06-15 RX ORDER — CEFAZOLIN SODIUM 2 G/100ML
2 INJECTION, SOLUTION INTRAVENOUS ONCE
Status: COMPLETED | OUTPATIENT
Start: 2023-06-15 | End: 2023-06-15

## 2023-06-15 RX ORDER — HYDROMORPHONE HYDROCHLORIDE 1 MG/ML
0.5 INJECTION, SOLUTION INTRAMUSCULAR; INTRAVENOUS; SUBCUTANEOUS
Status: DISCONTINUED | OUTPATIENT
Start: 2023-06-15 | End: 2023-06-15 | Stop reason: HOSPADM

## 2023-06-15 RX ORDER — LIDOCAINE HYDROCHLORIDE 10 MG/ML
0.5 INJECTION, SOLUTION EPIDURAL; INFILTRATION; INTRACAUDAL; PERINEURAL ONCE AS NEEDED
Status: COMPLETED | OUTPATIENT
Start: 2023-06-15 | End: 2023-06-15

## 2023-06-15 RX ORDER — PREGABALIN 150 MG/1
150 CAPSULE ORAL ONCE
Status: COMPLETED | OUTPATIENT
Start: 2023-06-15 | End: 2023-06-15

## 2023-06-15 RX ORDER — DEXAMETHASONE SODIUM PHOSPHATE 4 MG/ML
INJECTION, SOLUTION INTRA-ARTICULAR; INTRALESIONAL; INTRAMUSCULAR; INTRAVENOUS; SOFT TISSUE AS NEEDED
Status: DISCONTINUED | OUTPATIENT
Start: 2023-06-15 | End: 2023-06-15 | Stop reason: SURG

## 2023-06-15 RX ORDER — SODIUM CHLORIDE 0.9 % (FLUSH) 0.9 %
10 SYRINGE (ML) INJECTION AS NEEDED
Status: DISCONTINUED | OUTPATIENT
Start: 2023-06-15 | End: 2023-06-15 | Stop reason: HOSPADM

## 2023-06-15 RX ORDER — NAPROXEN SODIUM 220 MG
220 TABLET ORAL 2 TIMES DAILY WITH MEALS
Qty: 12 TABLET | Refills: 0
Start: 2023-06-16 | End: 2023-06-22

## 2023-06-15 RX ORDER — ASPIRIN 325 MG
325 TABLET, DELAYED RELEASE (ENTERIC COATED) ORAL DAILY
Status: DISCONTINUED | OUTPATIENT
Start: 2023-06-16 | End: 2023-06-15 | Stop reason: HOSPADM

## 2023-06-15 RX ORDER — TERAZOSIN 2 MG/1
2 CAPSULE ORAL NIGHTLY
Status: DISCONTINUED | OUTPATIENT
Start: 2023-06-15 | End: 2023-06-15 | Stop reason: HOSPADM

## 2023-06-15 RX ORDER — FENTANYL CITRATE 50 UG/ML
50 INJECTION, SOLUTION INTRAMUSCULAR; INTRAVENOUS
Status: DISCONTINUED | OUTPATIENT
Start: 2023-06-15 | End: 2023-06-15 | Stop reason: HOSPADM

## 2023-06-15 RX ORDER — LIDOCAINE HYDROCHLORIDE 10 MG/ML
INJECTION, SOLUTION EPIDURAL; INFILTRATION; INTRACAUDAL; PERINEURAL AS NEEDED
Status: DISCONTINUED | OUTPATIENT
Start: 2023-06-15 | End: 2023-06-15 | Stop reason: SURG

## 2023-06-15 RX ORDER — OXYCODONE HYDROCHLORIDE 5 MG/1
5 TABLET ORAL EVERY 4 HOURS PRN
Qty: 40 TABLET | Refills: 0 | Status: SHIPPED | OUTPATIENT
Start: 2023-06-15

## 2023-06-15 RX ORDER — IPRATROPIUM BROMIDE AND ALBUTEROL SULFATE 2.5; .5 MG/3ML; MG/3ML
3 SOLUTION RESPIRATORY (INHALATION) ONCE AS NEEDED
Status: DISCONTINUED | OUTPATIENT
Start: 2023-06-15 | End: 2023-06-15 | Stop reason: HOSPADM

## 2023-06-15 RX ORDER — LOSARTAN POTASSIUM 50 MG/1
100 TABLET ORAL
Status: DISCONTINUED | OUTPATIENT
Start: 2023-06-15 | End: 2023-06-15 | Stop reason: HOSPADM

## 2023-06-15 RX ORDER — ROPIVACAINE HYDROCHLORIDE 5 MG/ML
INJECTION, SOLUTION EPIDURAL; INFILTRATION; PERINEURAL AS NEEDED
Status: DISCONTINUED | OUTPATIENT
Start: 2023-06-15 | End: 2023-06-15 | Stop reason: HOSPADM

## 2023-06-15 RX ADMIN — TRANEXAMIC ACID 1000 MG: 10 INJECTION, SOLUTION INTRAVENOUS at 07:38

## 2023-06-15 RX ADMIN — SODIUM CHLORIDE, POTASSIUM CHLORIDE, SODIUM LACTATE AND CALCIUM CHLORIDE: 600; 310; 30; 20 INJECTION, SOLUTION INTRAVENOUS at 09:02

## 2023-06-15 RX ADMIN — OXYCODONE HYDROCHLORIDE 5 MG: 5 TABLET ORAL at 15:30

## 2023-06-15 RX ADMIN — DEXAMETHASONE SODIUM PHOSPHATE 8 MG: 4 INJECTION, SOLUTION INTRAMUSCULAR; INTRAVENOUS at 07:36

## 2023-06-15 RX ADMIN — SODIUM CHLORIDE 120 ML/HR: 0.9 INJECTION, SOLUTION INTRAVENOUS at 14:52

## 2023-06-15 RX ADMIN — Medication 10 ML: at 12:30

## 2023-06-15 RX ADMIN — PROPOFOL 50 MG: 10 INJECTION, EMULSION INTRAVENOUS at 07:31

## 2023-06-15 RX ADMIN — PROPOFOL 90 MCG/KG/MIN: 10 INJECTION, EMULSION INTRAVENOUS at 07:35

## 2023-06-15 RX ADMIN — MELOXICAM 15 MG: 15 TABLET ORAL at 12:30

## 2023-06-15 RX ADMIN — LIDOCAINE HYDROCHLORIDE 50 MG: 10 INJECTION, SOLUTION EPIDURAL; INFILTRATION; INTRACAUDAL; PERINEURAL at 07:31

## 2023-06-15 RX ADMIN — PROPOFOL 80 MCG/KG/MIN: 10 INJECTION, EMULSION INTRAVENOUS at 08:00

## 2023-06-15 RX ADMIN — LIDOCAINE HYDROCHLORIDE 0.5 ML: 10 INJECTION, SOLUTION EPIDURAL; INFILTRATION; INTRACAUDAL; PERINEURAL at 06:47

## 2023-06-15 RX ADMIN — ACETAMINOPHEN 1000 MG: 500 TABLET ORAL at 07:06

## 2023-06-15 RX ADMIN — CEFAZOLIN SODIUM 2 G: 2 INJECTION, SOLUTION INTRAVENOUS at 07:30

## 2023-06-15 RX ADMIN — GLYCOPYRROLATE 0.2 MG: 0.2 INJECTION INTRAMUSCULAR; INTRAVENOUS at 08:34

## 2023-06-15 RX ADMIN — SODIUM CHLORIDE, POTASSIUM CHLORIDE, SODIUM LACTATE AND CALCIUM CHLORIDE 9 ML/HR: 600; 310; 30; 20 INJECTION, SOLUTION INTRAVENOUS at 06:47

## 2023-06-15 RX ADMIN — LOSARTAN POTASSIUM 100 MG: 50 TABLET, FILM COATED ORAL at 12:30

## 2023-06-15 RX ADMIN — BUPIVACAINE HYDROCHLORIDE 2 ML: 5 INJECTION, SOLUTION PERINEURAL at 07:33

## 2023-06-15 RX ADMIN — HYDROCHLOROTHIAZIDE 12.5 MG: 12.5 CAPSULE ORAL at 12:30

## 2023-06-15 RX ADMIN — TRANEXAMIC ACID 1000 MG: 10 INJECTION, SOLUTION INTRAVENOUS at 09:07

## 2023-06-15 RX ADMIN — ONDANSETRON 4 MG: 2 INJECTION INTRAMUSCULAR; INTRAVENOUS at 08:45

## 2023-06-15 RX ADMIN — MELOXICAM 15 MG: 15 TABLET ORAL at 07:07

## 2023-06-15 RX ADMIN — CEFAZOLIN SODIUM 2 G: 2 INJECTION, SOLUTION INTRAVENOUS at 14:52

## 2023-06-15 RX ADMIN — PREGABALIN 150 MG: 150 CAPSULE ORAL at 07:06

## 2023-06-15 NOTE — CASE MANAGEMENT/SOCIAL WORK
"Discharge Planning Assessment  [facility]     Patient Name: Dewey Avalos  MRN: [id number]  Today's Date: 6/15/2023    Admit Date: 6/15/2023    Plan: Home with family assistance, [facility] and [facility] in [place]                   Discharge Plan       Row Name 06/15/23 1522       Plan    Plan Home with family assistance, [facility] and [facility] in [place]    Patient/Family in Agreement with Plan yes    Plan Comments Met with Mr. Avalos, his wife, Yokasta, and daughter, at the bedside for discharge planning.    Mr. Avalos lives with his wife in [facility].  Prior to admission, the patient ambulated independently and was independent with his ADL's.  No DME in the home.    The patient has been evaluated by PT and per notes, \"Pt amb in halls with FWW and navigated steps with CGAx2. No knee buckling or LOB noted. Activity limited by fatigue. HEP and precautions reviewed with pt. Recommend d/c home with assist and OPPT when medically appropriate. Pt cleared to d/c today from PT standpoint.\"  Mr. Avalos requested a rolling walker and did not have preference for provider.  [facility] to [facility] room.    Discussed physical therapy and Mr. Avalos requested [facility] in [place], [place], specifically [place] or [place].  Called and faxed the referral to Aviva at [facility].  Aviva accepted Mr. Avalos, and she will call him to schedule his appointment.    No other DC needs identified.    PCP is Hayden Bahena.  Insurance is [facility].  ACP documents filed to [employer].    The patient states that his family can assist him at home as needed and they will be transporting him home at discharge.    CM will continue to follow.    Final Discharge Disposition Code 01 - home or self-care                  Continued Care and Services - Admitted Since 6/15/2023       Durable Medical Equipment       Service " Provider Request Status Selected Services Address Phone Fax Patient Preferred    [facility] [place]  Selected Durable Medical Equipment [address], [facility] [address] [phone] --              Therapy       Service Provider Request Status Selected Services Address Phone Fax Patient Preferred    [facility]  [facility]patient [facility] [address] [address] [phone] --                  Expected Discharge Date and Time       Expected Discharge Date Expected Discharge Time    Nish 15, 2023                      Melly Montero RN

## 2023-06-15 NOTE — OP NOTE
DATE OF PROCEDURE:  06/15/23    PREOPERATIVE DIAGNOSIS: right hip arthritis    POSTOPERATIVE DIAGNOSIS: right hip arthritis    PROCEDURE PERFORMED: right total hip arthroplasty with Smith & Nephew components, anterior modified Vega-Schwarz approach    Surgical Approach: Anterior, modified Vega-Schwarz    IMPLANTS: #56 press-fit R3 cup, 30 mm screw,  neutral polyethylene liner,  #6 lateral offset press-fit Polarstem, +4 x 36 Bio-lox ceramic head    SURGEON: Damien Ugalde MD    ASSISTANT: Mali Mack PA-C  (Mali Mack PA-C was present and necessary for positioning, draping, retraction, instrumentation and closure.)    SPECIMENS: None    IMPLANTS:   Implant Name Type Inv. Item Serial No.  Lot No. LRB No. Used Action   DEV CONTRL TISS STRATAFIX SPIRAL MNCRYL UD 3/0 PLS 60CM - [id number] Implant DEV CONTRL TISS STRATAFIX SPIRAL MNCRYL UD 3/0 PLS 60CM  [employer]  DIV [employer] J  Right 1 Implanted   DEV CONTRL TISS STRATAFIX SYMM PDS PLUS ALEC CT-1 45CM - [id number] Implant DEV CONTRL TISS STRATAFIX SYMM PDS PLUS ALEC CT-1 45CM  [employer]  DIV [employer] J [employer] J  Right 1 Implanted   SHLL ACET R3 3H STD 56MM - [id number] Implant SHLL ACET R3 3H STD 56MM  [employer] [id number] Right 1 Implanted   SCRW SPH HD REFLECTION 6.5X30MM - [id number] Implant SCRW SPH HD REFLECTION 6.5X30MM  [employer] [id number] Right 1 Implanted   LINER ACET R3 XLPE 0D 68S40MF - [id number] Implant LINER ACET R3 XLPE 0D [id number]  [employer] [id number] Right 1 Implanted   STEM FEM/HIP POLARSTEM CMTLESS COLAR TI P/COAT SUAREZ 126DEG SZ6 - [id number] Implant STEM FEM/HIP POLARSTEM CMTLESS COLAR TI P/COAT SUAREZ 126DEG SZ6  [employer] [id number] Right 1 Implanted   HD FEM/HIP BIOLOXDELTA R3 12/14 MD 36MM PLS4 - [id number] Implant HD FEM/HIP BIOLOXDELTA R3 12/14 MD 36MM PLS4  SM[employer] [id number] Right 1 Implanted         ANESTHESIA:  Spinal    STAFF:  Circulator: Nabila Hester, RN  Radiology Technologist: Eran  ARAM Valencia(BETTIE)  Scrub Person: Emmanuel Sanches  Vendor Representative: Norman Card (Edwards & Nephew)  Nursing Assistant: Saul Alejandre PCT  Assistant: Mali Mack PA-C    ESTIMATED BLOOD LOSS: 200 mL     COMPLICATIONS: None    PREOPERATIVE ANTIBIOTICS: Ancef 2 g    INDICATIONS: The patient is a 72 y.o. male with a history of debilitating right hip pain secondary to osteoarthritis, that failed to improve in spite of conservative treatment. The patient opted for a right total hip arthroplasty at this time and consented for the procedure. Please see my office notes for details with regard to preoperative counseling and operative rationale.     DESCRIPTION OF PROCEDURE: The patient was positively identified in the preoperative holding area, brought to the operative suite, and placed in a supine position. After adequate spinal anesthetic had been achieved, the patient was placed in the supine position with a well padded folded blanket bolster under the right flank area, leaving the buttock free. After sterile prep and drape of the right hip and lower extremity, as well as draping the non-operative extremity to allow access for limb length assessment, a timeout procedure was performed to confirm the operative site, as well as the other parameters.     After placing a bump under the knee, a skin incision was made over the lateral border of the tensor fascia latae from the level of the anterior superior iliac spine distally on the anterior aspect of the hip for a modified Vega-Schwarz approach. Following a sharp skin incision, dissection was carried down to the level of the fascia, ensuring clear identification of the interval between the tensor and the fascia laterally.  Fascia was then incised from proximal to distal, leaving a good cuff of tissue for later repair, then working form distal to proximal perforating vessels were identified and cauterized, including the perforating vessels along the  "anterior edge of the gluteus medius.  With the anterior edge of the gluteus medius identified, a Cobra retractor was placed on the capsule over the superior aspect of the femoral neck.  After identifying the superior edge of the vastus lateralis distally, a second Cobra retractor was placed over the capsule overlying the inferior femoral neck.  The reflected head of the rectus femoris was identified, and the fascia released enough to allow placement of a single prong acetabular retractor. The knee bump was then removed, leg externally rotated, and anterior capsule excised first laterally then medially, and the labrum incised superiorly.  Cobra retractors were repositioned on the exposed femoral neck both superiorly and inferiorly.  Description of femoral head: Complete eburnation, osteophytes at the head neck junction.  A neck cut was then made at the head and neck junction with the aid of an oscillating saw blade, followed by a second cut at the base of the femoral neck.  The \"napkin ring\" femoral neck fragment was removed, as well as the femoral head after repositioning the posterior Cobra retractor just outside the hip capsule.    Acetabular exposure was then addressed by repositioning the anterior Cobra retractor between the capsule and the psoas tendon.  The labrum was then removed from the rim of the acetabulum anteriorly, superiorly and posteriorly, preserving the transverse acetabular ligament. The anterior Cobra retractor was replaced over the transverse acetabular ligament, and a Bowie retractor placed over the posterior wall of the acetabulum.  Description of acetabulum: Diffuse degeneration, with small inferior acetabular osteophyte. With the transverse acetabular ligament as a reference for cup positioning, the acetabulum was sequentially reamed up to 56 to accommodate a 56 press-fit R3 cup, which had excellent press-fit characteristics.  A single 30 mm screw was placed which had good purchase, " followed by a neutral polyethylene liner.    Attention was then redirected towards the femoral aspect. The non-operative limb was then placed on a padded [place] stand, to allow for appropriate positioning of the operative limb.  Using the bone hook for traction in the calcar region of the proximal femur, the posterior capsule was released adjacent to the greater trochanter to allow for delivery of the proximal femur with a double fang retractor.  With appropriate external rotation of the proximal femur and further adduction of the leg following double fang retractor placement and removal of the single-toothed fang anteriorly, a Bowie retractor was placed in the region of the calcar and femoral preparations were made to accommodate the polar stem.  Box osteotome was used to prepare the lateral aspect, followed by the T-handle canal finder, then sequential broaching of the femur to accommodate a #6 broach, lateral offset neck, with a +4 x 36 head.      Trial reduction was performed, full arc of motion noted, with appropriate limb lengths after leveling the table.  Intraoperative fluoroscopy showed appropriate implant alignment and leg lengths.  The hip was again dislocated and the final #6 lateral offset press-fit Polar stem placed, followed by +4 x 36 ceramic head, with the same reduction characteristics were noted as with the trial with no dislocation throughout the full arc of motion and appropriate limb lengths.      Therefore, the hip was copiously irrigated and attention directed towards closure. Fascia latae was closed with #1 Vicryl in an interrupted figure-of-eight fashion in 3 strategic locations both proximally, distally and in the central portion, followed by oversewing this from distal to proximal with a #1 StrataFix symmetric, which nicely sealed that layer, followed by closure of the subcutaneous layer with 2-0 Vicryl and the skin with 3-0 StrataFix in a running subcuticular fashion.  Adhesive wound  0 closure dressing was applied followed by a sterile dressing with 4 x 4s secured with micropore tape.  The patient tolerated the procedure well and was brought to the recovery room in good condition.     POSTOPERATIVE PLAN:  1. The patient will begin early range of motion and weight-bearing per the post anterior total hip arthroplasty protocol.   2. I anticipate brief hospitalization for initial rehabilitation and pain control followed by continued rehabilitation home health/outpatient physical therapy setting.  Patient may barrier for discharge later today if he is cleared medically and by physical therapy.  Follow-up in 3 weeks as planned.   3. Postoperative medical management with Dr. Nicholson.  4. Postoperative DVT prophylaxis with aspirin.  5. Postoperative IV antibiotics with Ancef.      Damien Ugalde MD  06/15/23  09:45 EDT

## 2023-06-15 NOTE — ANESTHESIA PREPROCEDURE EVALUATION
Anesthesia Evaluation     Patient summary reviewed and Nursing notes reviewed                Airway   Mallampati: II  TM distance: >3 FB  Neck ROM: full  No difficulty expected  Dental - normal exam     Pulmonary - negative pulmonary ROS and normal exam   Cardiovascular - normal exam    (+) hypertension,       Neuro/Psych- negative ROS  GI/Hepatic/Renal/Endo    (+)  GERD,  thyroid problem hypothyroidism    Musculoskeletal (-) negative ROS    Abdominal  - normal exam    Bowel sounds: normal.   Substance History - negative use     OB/GYN negative ob/gyn ROS         Other                      Anesthesia Plan    ASA 2     spinal     intravenous induction     Anesthetic plan, risks, benefits, and alternatives have been provided, discussed and informed consent has been obtained with: patient.    Plan discussed with CRNA.        CODE STATUS:

## 2023-06-15 NOTE — ANESTHESIA PROCEDURE NOTES
Spinal Block      Patient reassessed immediately prior to procedure    Patient location during procedure: OR  Indication:at surgeon's request  Performed By  CRNA/CAA: Aravind Sidhu CRNA  Preanesthetic Checklist  Completed: patient identified, IV checked, site marked, risks and benefits discussed, surgical consent, monitors and equipment checked, pre-op evaluation and timeout performed  Spinal Block Prep:  Patient Position:sitting  Sterile Tech:cap, gloves, sterile barriers and mask  Prep:Chloraprep  Patient Monitoring:blood pressure monitoring, continuous pulse oximetry and EKG    Spinal Block Procedure  Approach:midline  Guidance:landmark technique and palpation technique  Location:L4-L5  Needle Type:Mariah  Needle Gauge:22 G  Placement of Spinal needle event:cerebrospinal fluid aspirated  Paresthesia: no  Fluid Appearance:clear  Medications: bupivacaine (MARCAINE) 0.5 % injection - Injection   2 mL - 6/15/2023 7:33:00 AM   Post Assessment  Patient Tolerance:patient tolerated the procedure well with no apparent complications  Complications no  Additional Notes  Procedure:  Pt assisted to sitting position, with legs in position of comfort over side of bed.  Pt. instructed in optimal spine presentation, the spine was prepped/ Draped and the skin at insertion site was anesthetized with 1% Lidocaine 2 ml.  The spinal needle was then advanced until CSF flow was obtained and LA was injected:         Attempt by GEOFF HERRERA x2 unsuccessful. Attempt by JIM Sidhu CRNA x1 successful and atraumatic

## 2023-06-15 NOTE — ANESTHESIA POSTPROCEDURE EVALUATION
Patient: Dewey Avalos    Procedure Summary       Date: 06/15/23 Room / Location:  [facility] /  [facility]    Anesthesia Start: 0725 Anesthesia Stop: 0950    Procedure: MODIFIED ANTERIOR TOTAL HIP ARTHROPLASTY - RIGHT (Right: Hip) Diagnosis:       Primary osteoarthritis of right hip      (Primary osteoarthritis of right hip [M16.11])    Surgeons: Damien Ugalde MD Provider: Aravind Vinson MD    Anesthesia Type: spinal ASA Status: 2            Anesthesia Type: spinal    Vitals  Vitals Value Taken Time   /90 06/15/23 0948   Temp     Pulse 101 06/15/23 0949   Resp     SpO2 96 % 06/15/23 0949   Vitals shown include unvalidated device data.        Post Anesthesia Care and Evaluation    Patient location during evaluation: PACU  Patient participation: complete - patient participated  Level of consciousness: awake and alert  Pain management: adequate    Airway patency: patent  Anesthetic complications: No anesthetic complications  PONV Status: none  Cardiovascular status: hemodynamically stable and acceptable  Respiratory status: nonlabored ventilation, acceptable and nasal cannula  Hydration status: acceptable

## 2023-06-15 NOTE — H&P
Pre-Op H&P  Dewey Avalos  [id number]  1950      Chief complaint: Right hip pain      Subjective:  Patient is a 72 y.o.male presents for scheduled surgery by Dr. Ugalde. He anticipates a MODIFIED ANTERIOR TOTAL HIP ARTHROPLASTY - RIGHT  today. He has had right hip pain for over 5 years. The pain is worse with walking, standing and climbing stairs; resting improve the pain. He tried cortisone injection with short term pain relief. He denies use of assistive device for ambulation or recent falls.      Review of Systems:  Constitutional-- No fever, chills or sweats. No fatigue.  CV-- No chest pain, palpitation or syncope. +HTN  Resp-- No SOB, cough, hemoptysis  Skin--No rashes or lesions      Allergies: No Known Allergies      Home Meds:  Medications Prior to Admission   Medication Sig Dispense Refill Last Dose   • aspirin 81 MG chewable tablet Chew 1 tablet Daily.      • cetirizine (zyrTEC) 10 MG tablet Take 1 tablet by mouth Daily.      • Chlorhexidine Gluconate (Antiseptic Skin Cleanser) 4 % solution Apply  topically to the appropriate area as directed Daily. Shower with hibiclens solution as directed for 5 days prior to surgery 237 mL 0    • doxazosin (CARDURA) 2 MG tablet       • famotidine (PEPCID) 40 MG tablet Take 1 tablet by mouth Daily.      • losartan-hydrochlorothiazide (HYZAAR) 100-25 MG per tablet Take 1 tablet by mouth Daily.      • metroNIDAZOLE (METROCREAM) 0.75 % cream       • multivitamin with minerals tablet tablet Take 1 tablet by mouth Daily.      • naproxen sodium (ALEVE) 220 MG tablet Take 1 tablet by mouth 2 (Two) Times a Day As Needed.      • pantoprazole (PROTONIX) 40 MG EC tablet Take 1 tablet by mouth Daily.      • Synthroid 25 MCG tablet Take 1 tablet by mouth Every Morning. PT HASN'T STARTED TAKING YET.            PMH:   Past Medical History:   Diagnosis Date   • Ankle sprain     Numerous sprains over the years.   • Arthritis    • Arthritis of back 2012   • Back problem    • Hip  arthrosis 2013   • Hypertension    • Knee swelling 2014   • Lumbosacral disc disease 2018   • Periarthritis of shoulder 2013   • Plantar fasciitis      PSH:    Past Surgical History:   Procedure Laterality Date   • CHOLECYSTECTOMY  01/23/2023   • COLONOSCOPY     • KNEE SURGERY Left 03/28/2014    Deshawn Proctor in [place] performed knee scope   • TONSILLECTOMY         Immunization History:  Influenza: 2022  Pneumococcal: UTD  Tetanus: UTD  Covid : x4    Social History:   Tobacco:   Social History     Tobacco Use   Smoking Status Never   Smokeless Tobacco Never      Alcohol:     Social History     Substance and Sexual Activity   Alcohol Use Yes   • Alcohol/week: 12.0 standard drinks   • Types: 12 Cans of beer per week    Comment: 12 drinks/week         Physical Exam: VS: /104  HR 92  RR 16  T 98.3  Sat 96%RA      General Appearance:    Alert, cooperative, no distress, appears stated age   Head:    Normocephalic, without obvious abnormality, atraumatic   Lungs:     Clear to auscultation bilaterally, respirations unlabored    Heart:   Regular rate and rhythm, S1 and S2 normal    Abdomen:    Soft without tenderness   Extremities:   Extremities normal, atraumatic, no cyanosis or edema   Skin:   Skin color, texture, turgor normal, no rashes or lesions   Neurologic:   Grossly intact     Results Review:     LABS:  Lab Results   Component Value Date    WBC 10.10 06/01/2023    HGB 14.7 06/01/2023    HCT 44.7 06/01/2023    MCV 90.5 06/01/2023     06/01/2023    NEUTROABS 7.70 (H) 06/01/2023    GLUCOSE 109 (H) 06/01/2023    BUN 14 06/01/2023    CREATININE 0.88 06/01/2023     06/01/2023    K 3.7 06/01/2023     06/01/2023    CO2 23.0 06/01/2023    CALCIUM 9.2 06/01/2023       RADIOLOGY:  Imaging Results (Last 72 Hours)       ** No results found for the last 72 hours. **            I reviewed the patient's new clinical results.    Cancer Staging (if applicable)  Cancer Patient: __ yes __no  __unknown; If yes, clinical stage T:__ N:__M:__, stage group or __N/A      Impression: Right hip pain      Plan: MODIFIED ANTERIOR TOTAL HIP ARTHROPLASTY - RIGHT       JOSUE Burgos   6/15/2023   06:46 EDT     Agree with above - plan for right ALISSON    Damien Ugalde MD  06/15/23  06:58 EDT

## 2023-06-15 NOTE — THERAPY EVALUATION
Patient Name: Dewey Avalos  : 1950    MRN: [id number]                              Today's Date: 6/15/2023       Admit Date: 6/15/2023    Visit Dx:     ICD-10-CM ICD-9-CM   1. Status post right hip replacement  Z96.641 V43.64     Patient Active Problem List   Diagnosis    Primary osteoarthritis of right hip    Status post right hip replacement    HTN (hypertension)    Hypothyroidism (acquired)     Past Medical History:   Diagnosis Date    Ankle sprain     Numerous sprains over the years.    Arthritis     Arthritis of back 2012    Back problem     Hip arthrosis 2013    Hypertension     Knee swelling 2014    Lumbosacral disc disease 2018    Periarthritis of shoulder 2013    Plantar fasciitis      Past Surgical History:   Procedure Laterality Date    CHOLECYSTECTOMY  2023    COLONOSCOPY      KNEE SURGERY Left 2014    Deshawn Proctor in [place] performed knee scope    TONSILLECTOMY        General Information       Row Name 06/15/23 1348          Physical Therapy Time and Intention    Document Type evaluation  -     Mode of Treatment physical therapy  -       Row Name 06/15/23 1348          General Information    Patient Profile Reviewed yes  -HP     Prior Level of Function min assist:;all household mobility;community mobility;gait;transfer;bed mobility;ADL's  -     Existing Precautions/Restrictions fall;hip, anterior;other (see comments)  Mod Ant  -HP     Barriers to Rehab none identified  -       Row Name 06/15/23 1348          Living Environment    People in Home spouse  -       Row Name 06/15/23 1348          Home Main Entrance    Number of Stairs, Main Entrance one  -     Stair Railings, Main Entrance none  -       Row Name 06/15/23 1348          Stairs Within Home, Primary    Stairs, Within Home, Primary BR upstairs  -     Number of Stairs, Within Home, Primary twelve  -       Row Name 06/15/23 1348          Cognition    Orientation Status (Cognition) oriented x 3   -HP       Row Name 06/15/23 1348          Safety Issues, Functional Mobility    Safety Issues Affecting Function (Mobility) insight into deficits/self-awareness;awareness of need for assistance;safety precaution awareness  -HP     Impairments Affecting Function (Mobility) balance;endurance/activity tolerance;pain;strength  -HP               User Key  (r) = Recorded By, (t) = Taken By, (c) = Cosigned By      Initials Name Provider Type     Jewell Byrne PT Physical Therapist                   Mobility       Row Name 06/15/23 1349          Bed Mobility    Bed Mobility supine-sit  -HP     Supine-Sit [place] (Bed Mobility) modified independence  -       Row Name 06/15/23 1349          Transfers    Comment, (Transfers) VC for hand placement and encouraged equal WB between BLE  -       Row Name 06/15/23 1342          Sit-Stand Transfer    Sit-Stand [place] (Transfers) contact guard;2 person assist  -HP     Assistive Device (Sit-Stand Transfers) walker, front-wheeled  -HP       Row Name 06/15/23 1341          Gait/Stairs (Locomotion)    [place] Level (Gait) contact guard;2 person assist  -HP     Assistive Device (Gait) walker, front-wheeled  -HP     Ambulated day of surgery or within 4 hours of PACU discharge yes  -HP     Distance in Feet (Gait) 330  -HP     Deviations/Abnormal Patterns (Gait) bilateral deviations;gait speed decreased;stride length decreased  -HP     Bilateral Gait Deviations forward flexed posture;heel strike decreased  -HP     [place] Level (Stairs) contact guard;2 person assist  -HP     Handrail Location (Stairs) left side (ascending)  -HP     Number of Steps (Stairs) 10  -HP     Ascending Technique (Stairs) step-to-step  -HP     Descending Technique (Stairs) step-to-step  -HP     Comment, (Gait/Stairs) Pt amb in halls with FWW and CGAx2. Pt demonstrated step-through gait pattern with decreased stride length on LLE and forward flexed posture. Good correction with VC. Pt  navigated 10 steps with L HR and CGAx2. Pt navigated one step with backwards technique, FWW and CGAx2. VC for sequencing with good recall. No knee buckling or LOB noted. Activity limited by fatigue.  -[facility] Name 06/15/23 134          Mobility    Extremity Weight-bearing Status right lower extremity  -     Right Lower Extremity (Weight-bearing Status) weight-bearing as tolerated (WBAT)  -               User Key  (r) = Recorded By, (t) = Taken By, (c) = Cosigned By      Initials Name Provider Type     Jewell Byrne PT Physical Therapist                   Obj/Interventions       [facility] Name 06/15/23 1351          Range of Motion Comprehensive    General Range of Motion no range of motion deficits identified  -[facility] Name 06/15/23 1351          Strength Comprehensive (MMT)    General Manual Muscle Testing (MMT) Assessment lower extremity strength deficits identified  -     Comment, General Manual Muscle Testing (MMT) Assessment Pt able to perform B active ankle DF and SLR  -[facility] Name 06/15/23 1351          Motor Skills    Therapeutic Exercise hip;knee;ankle  -[facility] Name 06/15/23 1351          Hip (Therapeutic Exercise)    Hip (Therapeutic Exercise) strengthening exercise  -     Hip Strengthening (Therapeutic Exercise) right;aBduction;heel slides;3 repetitions  -[facility] Name 06/15/23 1351          Knee (Therapeutic Exercise)    Knee (Therapeutic Exercise) strengthening exercise;isometric exercises  -     Knee Isometrics (Therapeutic Exercise) quad sets;gluteal sets;10 repetitions;right  -     Knee Strengthening (Therapeutic Exercise) right;SLR (straight leg raise);LAQ (long arc quad);3 repetitions  -[facility] Name 06/15/23 1351          Ankle (Therapeutic Exercise)    Ankle (Therapeutic Exercise) AROM (active range of motion)  -     Ankle AROM (Therapeutic Exercise) bilateral;dorsiflexion;plantarflexion;10 repetitions  -[facility] Name 06/15/23 1351          Balance     Balance Assessment sitting static balance;sitting dynamic balance;sit to stand dynamic balance;standing static balance;standing dynamic balance  -     Static Sitting Balance modified independence  -     Dynamic Sitting Balance modified independence  -     Position, Sitting Balance sitting edge of bed  -     Static Standing Balance supervision  -     Dynamic Standing Balance contact guard  -HP     Position/Device Used, Standing Balance supported;walker, rolling  -HP     Balance Interventions sitting;standing;sit to stand;occupation based/functional task  -       Row Name 06/15/23 1357          Sensory Assessment (Somatosensory)    Sensory Assessment (Somatosensory) LE sensation intact  -               User Key  (r) = Recorded By, (t) = Taken By, (c) = Cosigned By      Initials Name Provider Type     Jewell Byrne, PT Physical Therapist                   Goals/Plan       Row Name 06/15/23 6394          Transfer Goal 1 (PT)    Activity/Assistive Device (Transfer Goal 1, PT) sit-to-stand/stand-to-sit  -     [place] Level/Cues Needed (Transfer Goal 1, PT) modified independence  -HP     Time Frame (Transfer Goal 1, PT) long term goal (LTG);3 days  -HP     Progress/Outcome (Transfer Goal 1, PT) goal ongoing  -       Row Name 06/15/23 8076          Gait Training Goal 1 (PT)    Activity/Assistive Device (Gait Training Goal 1, PT) gait (walking locomotion)  -     [place] Level (Gait Training Goal 1, PT) modified independence  -HP     Distance (Gait Training Goal 1, PT) 500  -HP     Time Frame (Gait Training Goal 1, PT) long term goal (LTG);3 days  -HP     Progress/Outcome (Gait Training Goal 1, PT) goal ongoing  -       Row Name 06/15/23 8879          Stairs Goal 1 (PT)    Activity/Assistive Device (Stairs Goal 1, PT) ascending stairs;descending stairs  -     [place] Level/Cues Needed (Stairs Goal 1, PT) modified independence  -HP     Number of Stairs (Stairs Goal 1, PT) 12  -HP      Time Frame (Stairs Goal 1, PT) long term goal (LTG);3 days  -HP     Progress/Outcome (Stairs Goal 1, PT) goal ongoing  -       Row Name 06/15/23 1354          Therapy Assessment/Plan (PT)    Planned Therapy Interventions (PT) balance training;bed mobility training;gait training;home exercise program;transfer training;patient/family education;stretching;strengthening;stair training;ROM (range of motion)  -               User Key  (r) = Recorded By, (t) = Taken By, (c) = Cosigned By      Initials Name Provider Type     Jewell Byrne, PT Physical Therapist                   Clinical Impression       Row Name 06/15/23 1353          Pain    Pretreatment Pain Rating 2/10  -HP     Posttreatment Pain Rating 4/10  -     Pain Location - Side/Orientation Right  -     Pain Location incisional  -     Pain Location - hip  -     Pain Intervention(s) Repositioned;Cold applied;Ambulation/increased activity;Elevated  -       Row Name 06/15/23 2782          Plan of Care Review    Plan of Care Reviewed With patient;spouse  -     Progress no change  -     Outcome Evaluation PT eval complete. Pt amb in halls with FWW and navigated steps with CGAx2. No knee buckling or LOB noted. Activity limited by fatigue. HEP and precautions reviewed with pt. Recommend d/c home with assist and OPPT when medically appropriate. Pt cleared to d/c today from PT standpoint. Pt will need FWW prior to d/c.  -       Row Name 06/15/23 6933          Therapy Assessment/Plan (PT)    Rehab Potential (PT) good, to achieve stated therapy goals  -     Criteria for Skilled Interventions Met (PT) yes;meets criteria;skilled treatment is necessary  -     Therapy Frequency (PT) 2 times/day  -       Row Name 06/15/23 1350          Vital Signs    Pre Systolic BP Rehab --  VSS  -HP     Pre Patient Position Supine  -HP     Intra Patient Position Standing  -HP     Post Patient Position Sitting  -       Row Name 06/15/23 3384           Positioning and Restraints    Pre-Treatment Position in bed  -HP     Post Treatment Position chair  -HP     In Chair notified nsg;reclined;sitting;call light within reach;encouraged to call for assist;exit alarm on;with family/caregiver;legs elevated;compression device  -HP               User Key  (r) = Recorded By, (t) = Taken By, (c) = Cosigned By      Initials Name Provider Type     Jewell Byrne PT Physical Therapist                   Outcome Measures       Row Name 06/15/23 8844          How much help from another person do you currently need...    Turning from your back to your side while in flat bed without using bedrails? 4  -HP     Moving from lying on back to sitting on the side of a flat bed without bedrails? 4  -HP     Moving to and from a bed to a chair (including a wheelchair)? 4  -HP     Standing up from a chair using your arms (e.g., wheelchair, bedside chair)? 4  -HP     Climbing 3-5 steps with a railing? 3  -HP     To walk in hospital room? 4  -HP     AM-PAC 6 Clicks Score (PT) 23  -HP     Highest level of mobility 7 --> Walked 25 feet or more  -       Row Name 06/15/23 5714          PADD    Diagnosis 2  -HP     Gender 2  -HP     Age Group 1  -HP     Gait Distance 1  -HP     Assist Level 1  -HP     Home Support 3  -HP     PADD Score 10  -HP     Patient Preference home with outpatient rehab  -HP     Prediction by PADD Score directly home (with home health or out-patient rehab)  -       Row Name 06/15/23 3002          Functional Assessment    Outcome Measure Options AM-PAC 6 Clicks Basic Mobility (PT);PADD  -HP               User Key  (r) = Recorded By, (t) = Taken By, (c) = Cosigned By      Initials Name Provider Type     Jewell Byrne PT Physical Therapist                                 Physical Therapy Education       Title: PT OT SLP Therapies (Done)       Topic: Physical Therapy (Done)       Point: Mobility training (Done)       Learning Progress Summary             Patient  Acceptance, E,D, VU,DU by  at 6/15/2023 1355                         Point: Home exercise program (Done)       Learning Progress Summary             Patient Acceptance, E,D, VU,DU by  at 6/15/2023 1355                         Point: Body mechanics (Done)       Learning Progress Summary             Patient Acceptance, E,D, VU,DU by  at 6/15/2023 1355                         Point: Precautions (Done)       Learning Progress Summary             Patient Acceptance, E,D, VU,DU by  at 6/15/2023 1355                                         User Key       Initials Effective Dates Name Provider Type [facility] 06/01/21 -  Jewell Byrne, PT Physical Therapist PT                  PT Recommendation and Plan  Planned Therapy Interventions (PT): balance training, bed mobility training, gait training, home exercise program, transfer training, patient/family education, stretching, strengthening, stair training, ROM (range of motion)  Plan of Care Reviewed With: patient, spouse  Progress: no change  Outcome Evaluation: PT eval complete. Pt amb in halls with FWW and navigated steps with CGAx2. No knee buckling or LOB noted. Activity limited by fatigue. HEP and precautions reviewed with pt. Recommend d/c home with assist and OPPT when medically appropriate. Pt cleared to d/c today from PT standpoint. Pt will need FWW prior to d/c.     Time Calculation:    PT Charges       Row Name 06/15/23 1306             Time Calculation    Start Time 1306  -HP      PT Received On 06/15/23  -      PT Goal Re-Cert Due Date 06/25/23  -         Timed Charges    28577 - Gait Training Minutes  10  -HP         Untimed Charges    PT Eval/Re-eval Minutes 50  -HP         Total Minutes    Timed Charges Total Minutes 10  -HP      Untimed Charges Total Minutes 50  -HP       Total Minutes 60  -HP                User Key  (r) = Recorded By, (t) = Taken By, (c) = Cosigned By      Initials Name Provider Type     Jewell Byrne, JUICE Physical  Therapist                  Therapy Charges for Today       Code Description Service Date Service Provider Modifiers Qty    51816768519 HC GAIT TRAINING EA 15 MIN 6/15/2023 Jewell Byrne, PT GP 1    08919064288 HC PT EVAL LOW COMPLEXITY 4 6/15/2023 Jewell Byrne, PT GP 1    52160528292 HC PT THER SUPP EA 15 MIN 6/15/2023 Jewell Byrne, PT GP 3            PT G-Codes  Outcome Measure Options: AM-PAC 6 Clicks Basic Mobility (PT), PADD  AM-PAC 6 Clicks Score (PT): 23  PT Discharge Summary  Anticipated Discharge Disposition (PT): home with assist, home with outpatient therapy services    Jewell Byrne, PT  6/15/2023

## 2023-06-15 NOTE — PLAN OF CARE
Goal Outcome Evaluation:  Plan of Care Reviewed With: patient, spouse        Progress: no change  Outcome Evaluation: PT eval complete. Pt amb in halls with FWW and navigated steps with CGAx2. No knee buckling or LOB noted. Activity limited by fatigue. HEP and precautions reviewed with pt. Recommend d/c home with assist and OPPT when medically appropriate. Pt cleared to d/c today from PT standpoint. Pt will need FWW prior to d/c.

## 2023-06-15 NOTE — H&P
Patient Name: Dewey Avalos  MRN: [id number]  : 1950  DOS: 6/15/2023    Attending: Damien Ugalde MD     Primary Care Provider: Hayden Bahena MD      Chief complaint: Right hip pain    Subjective   Patient is a pleasant 72 y.o. male presented for scheduled surgery by Dr. Ugalde.    Per his note (The patient is a 72 y.o. male with a history of debilitating right hip pain secondary to osteoarthritis, that failed to improve in spite of conservative treatment. The patient opted for a right total hip arthroplasty at this time and consented for the procedure. Please see my office notes for details with regard to preoperative counseling and operative rationale.).    He underwent right total hip arthroplasty under spinal anesthesia, tolerated surgery well, is admitted for further management.    Seen in his room postoperatively, doing fairly well, no complains of nausea, vomiting, or shortness of breath.  He has no history of DVT or PE.    Reviewed with patient his past medical history and medications noted below.    Very motivated to achieve goals for possible discharge home later in the day.    Allergies:  No Known Allergies    Meds:  Medications Prior to Admission   Medication Sig Dispense Refill Last Dose    aspirin 81 MG chewable tablet Chew 1 tablet Daily.   Past Week    cetirizine (zyrTEC) 10 MG tablet Take 1 tablet by mouth Daily.   2023    Chlorhexidine Gluconate (Antiseptic Skin Cleanser) 4 % solution Apply  topically to the appropriate area as directed Daily. Shower with hibiclens solution as directed for 5 days prior to surgery 237 mL 0 2023    doxazosin (CARDURA) 2 MG tablet    2023    famotidine (PEPCID) 40 MG tablet Take 1 tablet by mouth Daily.   2023    losartan-hydrochlorothiazide (HYZAAR) 100-25 MG per tablet Take 1 tablet by mouth Daily.   2023    metroNIDAZOLE (METROCREAM) 0.75 % cream    2023    multivitamin with minerals tablet tablet Take 1 tablet  "by mouth Daily.   Past Month    naproxen sodium (ALEVE) 220 MG tablet Take 1 tablet by mouth 2 (Two) Times a Day As Needed.   Past Month    pantoprazole (PROTONIX) 40 MG EC tablet Take 1 tablet by mouth Daily.   6/15/2023 at 0400    Synthroid 25 MCG tablet Take 1 tablet by mouth Every Morning. PT HASN'T STARTED TAKING YET.   6/15/2023 at 0400          Past Medical History:   Diagnosis Date    Ankle sprain     Numerous sprains over the years.    Arthritis     Arthritis of back 2012    Back problem     Hip arthrosis 2013    Hypertension     Knee swelling 2014    Lumbosacral disc disease 2018    Periarthritis of shoulder 2013    Plantar fasciitis      Past Surgical History:   Procedure Laterality Date    CHOLECYSTECTOMY  01/23/2023    COLONOSCOPY      KNEE SURGERY Left 03/28/2014    Deshawn Proctor in [place] performed knee scope    TONSILLECTOMY       Family History   Problem Relation Age of Onset    Arthritis Mother     Heart disease Father     Arthritis Father     Diabetes Brother     Cancer Maternal Grandmother     Diabetes Paternal Grandfather     Diabetes Brother      Social History     Tobacco Use    Smoking status: Never    Smokeless tobacco: Never   Vaping Use    Vaping Use: Never used   Substance Use Topics    Alcohol use: Yes     Alcohol/week: 12.0 standard drinks     Types: 12 Cans of beer per week     Comment: 12 drinks/week    Drug use: Never       Review of Systems  Pertinent items are noted in HPI, all other systems reviewed and negative    Vital Signs  /89   Pulse 85   Temp 97.2 °F (36.2 °C) (Temporal)   Resp 16   Ht 195.6 cm (77\")   Wt 97.5 kg (215 lb)   SpO2 96%   BMI 25.50 kg/m²     Physical Exam:    General Appearance:    Alert, cooperative, in no acute distress   Head:    Normocephalic, without obvious abnormality, atraumatic   Eyes:            Lids and lashes normal, conjunctivae and sclerae normal, no   icterus, no pallor, corneas clear    Ears:    Ears appear intact " with no abnormalities noted   Throat:   No oral lesions, no thrush, oral mucosa moist   Neck:   No adenopathy, supple, trachea midline, no thyromegaly         Lungs:     Clear to auscultation,respirations regular, even and   unlabored. No wheezes or rales.    Heart:    Regular rhythm and normal rate, normal S1 and S2, no murmur, no gallop   Abdomen:     Normal bowel sounds, no masses, no organomegaly, soft        non-tender, non-distended, no guarding, no rebound                 tenderness   Genitalia:    Deferred   Extremities: Right LE, CDI dressing over right hip.  No clubbing, cyanosis, or edema.   Pulses:   Pulses palpable and equal bilaterally   Skin:   No bleeding, bruising or rash   Neurologic:   Cranial nerves 2 - 12 grossly intact, intact flexion and dorsiflexion bilateral feet      I reviewed the patient's new clinical results.             Invalid input(s): NEUTOPHILPCT,  EOSPCT  Results from last 7 days   Lab Units 06/15/23  0648   POTASSIUM mmol/L 3.3*     Lab Results   Component Value Date    HGBA1C 5.50 06/01/2023      Latest Reference Range & Units 06/01/23 11:41   Sodium 136 - 145 mmol/L 139   Potassium 3.5 - 5.2 mmol/L 3.7   Chloride 98 - 107 mmol/L 102   CO2 22.0 - 29.0 mmol/L 23.0   Anion Gap 5.0 - 15.0 mmol/L 14.0   BUN 8 - 23 mg/dL 14   Creatinine 0.76 - 1.27 mg/dL 0.88   BUN/Creatinine Ratio 7.0 - 25.0  15.9   eGFR >60.0 mL/min/1.73 91.4   Glucose 65 - 99 mg/dL 109 (H)   Calcium 8.6 - 10.5 mg/dL 9.2   (H): Data is abnormally high   Latest Reference Range & Units 06/01/23 11:41   WBC 3.40 - 10.80 10*3/mm3 10.10   RBC 4.14 - 5.80 10*6/mm3 4.94   Hemoglobin 13.0 - 17.7 g/dL 14.7   Hematocrit 37.5 - 51.0 % 44.7   Platelets 140 - 450 10*3/mm3 234   RDW 12.3 - 15.4 % 13.0   MCV 79.0 - 97.0 fL 90.5   MCH 26.6 - 33.0 pg 29.8   MCHC 31.5 - 35.7 g/dL 32.9   MPV 6.0 - 12.0 fL 9.4   RDW-SD 37.0 - 54.0 fl 42.6       Assessment and Plan:       Status post right hip replacement    Primary osteoarthritis of  right hip    HTN (hypertension)    Hypothyroidism (acquired)      Plan:    1. PT/OT,  Weight bearing as tolerated right LE.  2. Pain control-prns  3. IS-encourage  4. DVT proph- Mechanicals and aspirin  5. Bowel regimen  6. Resume home medications as appropriate  7. DC planning for home    Patient is very motivated to work with physical therapy and achieve  mobility and pain control among other goals for possible discharge home later in the day.    We reviewed these goals and discussed with patient tracking  progress for the next few hours and if all is achieved to receive next antibiotic prophylactic dose and be discharged home.     We discussed medications and precriptions at time of discharge including DVT prophylaxis, pain control, and bowel regimen.  All questions were answered .    Patient expressed understanding and agreement.       Dragon disclaimer:  Part of this encounter note is an electronic transcription/translation of spoken language to printed text. The electronic translation of spoken language may permit erroneous, or at times, nonsensical words or phrases to be inadvertently transcribed; Although I have reviewed the note for such errors, some may still exist.    Fani Nicholson MD  06/15/23  12:17 EDT

## 2023-06-18 ENCOUNTER — DOCUMENTATION (OUTPATIENT)
Dept: ORTHOPEDIC SURGERY | Facility: CLINIC | Age: 73
End: 2023-06-18
Payer: MEDICARE

## 2023-06-18 NOTE — PROGRESS NOTES
6/18/2023: I spoke with this patient yesterday, he reported symptoms of bladder distention, he was unable to urinate yesterday morning, he had some leakage of urine.  I recommended he go to the emergency room.  He was evidently seen in the [place] emergency room and a catheter was placed.  I recommend that he have follow-up with urology.  His wife called me from the ER and asked about changing the dressing.  I explained that if the ER wanted to change his hip dressing that was okay.  They should keep it clean and dry.  They will follow-up with Dr. Ugalde.

## 2023-08-28 ENCOUNTER — OFFICE VISIT (OUTPATIENT)
Dept: ORTHOPEDIC SURGERY | Facility: CLINIC | Age: 73
End: 2023-08-28
Payer: MEDICARE

## 2023-08-28 DIAGNOSIS — Z96.641 STATUS POST TOTAL REPLACEMENT OF RIGHT HIP: Primary | ICD-10-CM

## 2023-08-28 DIAGNOSIS — Z47.89 ORTHOPEDIC AFTERCARE: ICD-10-CM

## 2023-08-28 PROCEDURE — 1159F MED LIST DOCD IN RCRD: CPT | Performed by: ORTHOPAEDIC SURGERY

## 2023-08-28 PROCEDURE — 1160F RVW MEDS BY RX/DR IN RCRD: CPT | Performed by: ORTHOPAEDIC SURGERY

## 2023-08-28 PROCEDURE — 99024 POSTOP FOLLOW-UP VISIT: CPT | Performed by: ORTHOPAEDIC SURGERY

## 2023-08-28 RX ORDER — CEFDINIR 300 MG/1
CAPSULE ORAL
COMMUNITY
Start: 2023-08-02

## 2023-08-28 RX ORDER — PHENAZOPYRIDINE HYDROCHLORIDE 200 MG/1
TABLET, FILM COATED ORAL
COMMUNITY
Start: 2023-08-09

## 2023-08-28 RX ORDER — FINASTERIDE 5 MG/1
1 TABLET, FILM COATED ORAL DAILY
COMMUNITY
Start: 2023-08-18

## 2023-08-28 NOTE — PROGRESS NOTES
[facility] [place] Orthopaedic Surgery Clinic Note    Subjective     Chief Complaint   Patient presents with    Post-op     7 week recheck -- 10 week status post MODIFIED ANTERIOR TOTAL HIP ARTHROPLASTY - RIGHT - 6/15/23        HPI    It has been 7  week(s) since Mr. Avalos's last visit. He returns to clinic today for postoperative follow-up of right hip arthroplasty. The issue has been ongoing for 10 week(s). He rates his pain a 1/10 on the pain scale. Previous/current treatments: physical therapy. Current symptoms: swelling. The pain is worse with lying on affected side;  Overall, he is doing better.  90% improvement compared to his preoperative symptoms.  Fully ambulatory without external aids.  Urinary retention has resolved, following his TUR procedure.    I have reviewed the following portions of the patient's history and agree with: History of Present Illness and Review of Systems    Patient Active Problem List   Diagnosis    Primary osteoarthritis of right hip    Status post right hip replacement    HTN (hypertension)    Hypothyroidism (acquired)     Past Medical History:   Diagnosis Date    Ankle sprain     Numerous sprains over the years.    Arthritis     Arthritis of back 2012    Back problem     Hip arthrosis 2013    Hypertension     Knee swelling 2014    Lumbosacral disc disease 2018    Periarthritis of shoulder 2013    Plantar fasciitis       Past Surgical History:   Procedure Laterality Date    CHOLECYSTECTOMY  01/23/2023    COLONOSCOPY      HIP SURGERY  06/15/2023    KNEE SURGERY Left 03/28/2014    Deshawn Proctor in [place] performed knee scope    TONSILLECTOMY      TOTAL HIP ARTHROPLASTY Right 06/15/2023    Procedure: MODIFIED ANTERIOR TOTAL HIP ARTHROPLASTY - RIGHT;  Surgeon: Damien Ugalde MD;  Location: [facility];  Service: Orthopedics;  Laterality: Right;      Family History   Problem Relation Age of Onset    Arthritis Mother     Heart disease Father     Arthritis Father     Diabetes Brother      Cancer Maternal Grandmother     Diabetes Paternal Grandfather     Diabetes Brother      Social History     Socioeconomic History    Marital status:    Tobacco Use    Smoking status: Never    Smokeless tobacco: Never   Vaping Use    Vaping Use: Never used   Substance and Sexual Activity    Alcohol use: Yes     Alcohol/week: 12.0 standard drinks     Types: 12 Cans of beer per week     Comment: 12 drinks/week    Drug use: Never    Sexual activity: Yes     Partners: Female      Current Outpatient Medications on File Prior to Visit   Medication Sig Dispense Refill    aspirin 81 MG chewable tablet Chew 1 tablet Daily.      cefdinir (OMNICEF) 300 MG capsule TAKE 1 CAPSULE BY MOUTH EVERY 12 HOURS FOR 8 DAYS      cetirizine (zyrTEC) 10 MG tablet Take 1 tablet by mouth Daily.      doxazosin (CARDURA) 2 MG tablet       famotidine (PEPCID) 40 MG tablet Take 1 tablet by mouth Daily.      finasteride (PROSCAR) 5 MG tablet Take 1 tablet by mouth Daily.      hydrocortisone 2.5 % ointment       losartan-hydrochlorothiazide (HYZAAR) 100-25 MG per tablet Take 1 tablet by mouth Daily.      metroNIDAZOLE (METROCREAM) 0.75 % cream       multivitamin with minerals tablet tablet Take 1 tablet by mouth Daily.      pantoprazole (PROTONIX) 40 MG EC tablet Take 1 tablet by mouth Daily.      phenazopyridine (PYRIDIUM) 200 MG tablet TAKE 1 TABLET BY MOUTH THREE TIMES DAILY AS NEEDED FOR DYSURIA      Synthroid 25 MCG tablet Take 1 tablet by mouth Every Morning. PT HASN'T STARTED TAKING YET.      [DISCONTINUED] oxyCODONE (Roxicodone) 5 MG immediate release tablet Take 1 tablet by mouth Every 4 (Four) Hours As Needed for Moderate Pain. 40 tablet 0    [DISCONTINUED] tamsulosin (FLOMAX) 0.4 MG capsule 24 hr capsule Take 1 capsule by mouth Daily.       No current facility-administered medications on file prior to visit.      No Known Allergies     Review of Systems   Constitutional:  Negative for activity change, appetite change, chills,  diaphoresis, fatigue, fever and unexpected weight change.   HENT:  Negative for congestion, dental problem, drooling, ear discharge, ear pain, facial swelling, hearing loss, mouth sores, nosebleeds, postnasal drip, rhinorrhea, sinus pressure, sneezing, sore throat, tinnitus, trouble swallowing and voice change.    Eyes:  Negative for photophobia, pain, discharge, redness, itching and visual disturbance.   Respiratory:  Negative for apnea, cough, choking, chest tightness, shortness of breath, wheezing and stridor.    Cardiovascular:  Negative for chest pain, palpitations and leg swelling.   Gastrointestinal:  Negative for abdominal distention, abdominal pain, anal bleeding, blood in stool, constipation, diarrhea, nausea, rectal pain and vomiting.   Endocrine: Negative for cold intolerance, heat intolerance, polydipsia, polyphagia and polyuria.   Genitourinary:  Negative for decreased urine volume, difficulty urinating, dysuria, enuresis, flank pain, frequency, genital sores, hematuria and urgency.   Musculoskeletal:  Positive for arthralgias. Negative for back pain, gait problem, joint swelling, myalgias, neck pain and neck stiffness.   Skin:  Negative for color change, pallor, rash and wound.   Allergic/Immunologic: Negative for environmental allergies, food allergies and immunocompromised state.   Neurological:  Negative for dizziness, tremors, seizures, syncope, facial asymmetry, speech difficulty, weakness, light-headedness, numbness and headaches.   Hematological:  Negative for adenopathy. Does not bruise/bleed easily.   Psychiatric/Behavioral:  Negative for agitation, behavioral problems, confusion, decreased concentration, dysphoric mood, hallucinations, self-injury, sleep disturbance and suicidal ideas. The patient is not nervous/anxious and is not hyperactive.       Objective      Physical Exam  There were no vitals taken for this visit.    There is no height or weight on file to calculate  BMI.    General:   Mental Status:  Alert   Appearance: Cooperative, in no acute distress   Build and Nutrition: Well-nourished well-developed male   Orientation: Alert and oriented to person, place and time   Posture: Normal   Gait: Well-nourished well-developed    Integument:   Right hip: Wound is well-healed with no signs of infection    Lower Extremity:   Right Hip:    Tenderness:  None    Swelling:  None    Crepitus:  None    Range of motion:  External Rotation: 30ø       Internal Rotation: 30ø       Flexion:  100ø       Extension:  0ø    Deformities:  None  Functional testing: Negative [facility]    No leg length discrepancy      Imaging/Studies  Imaging Results (Last 24 Hours)       ** No results found for the last 24 hours. **              Assessment and Plan     Diagnoses and all orders for this visit:    1. Status post total replacement of right hip (Primary)    2. S/P TJR - Global        1. Status post total replacement of right hip    2. S/P TJR - Global        I reviewed my findings with the patient.  His right total hip arthroplasty is functioning well, and he is overall pleased with results.  I will see him back in 10 months, which will be a 1 year checkup with x-rays.  I will see him back sooner for any problems.    Return in about 10 months (around 6/28/2024) for Recheck with X-Rays.      Damien Ugalde MD  08/28/23  09:42 EDT

## 2023-10-30 ENCOUNTER — OFFICE VISIT (OUTPATIENT)
Dept: ORTHOPEDIC SURGERY | Facility: CLINIC | Age: 73
End: 2023-10-30
Payer: MEDICARE

## 2023-10-30 VITALS
HEIGHT: 77 IN | WEIGHT: 212 LBS | DIASTOLIC BLOOD PRESSURE: 100 MMHG | SYSTOLIC BLOOD PRESSURE: 150 MMHG | BODY MASS INDEX: 25.03 KG/M2

## 2023-10-30 DIAGNOSIS — M25.562 CHRONIC PAIN OF LEFT KNEE: ICD-10-CM

## 2023-10-30 DIAGNOSIS — M17.12 PRIMARY OSTEOARTHRITIS OF LEFT KNEE: Primary | ICD-10-CM

## 2023-10-30 DIAGNOSIS — G89.29 CHRONIC PAIN OF LEFT KNEE: ICD-10-CM

## 2023-10-30 NOTE — PROGRESS NOTES
"    [facility] [place] Orthopaedic Surgery Clinic Note        Subjective     CC: Follow-up (5 mo f/u - Primary osteoarthritis of left knee)      TAYE Avalos is a 72 y.o. male. Patient returns for evaluation of left knee pain. Treated with visco series--completed 5/22/2023 (Synvisc). Reports didn't work--no relief of symptoms. Lucrecia has worked in the past.      Patient has also had steroid injection 3-4 years ago which helped but patient feels visco series lasts longer.    Pain scale: 5/10. Associated symptoms pain, swelling and stiffness. Pain worse with walking, standing. Resting helps some.    Overall, patient's symptoms are as above.    ROS:    Constiutional:Pt denies fever, chills, nausea, or vomiting.  MSK:as above        Objective      Past Medical History  Past Medical History:   Diagnosis Date    Ankle sprain     Numerous sprains over the years.    Arthritis     Arthritis of back 2012    Back problem     Hip arthrosis 2013    Hypertension     Knee swelling 2014    Lumbosacral disc disease 2018    Periarthritis of shoulder 2013    Plantar fasciitis      Social History     Socioeconomic History    Marital status:    Tobacco Use    Smoking status: Never    Smokeless tobacco: Never   Vaping Use    Vaping Use: Never used   Substance and Sexual Activity    Alcohol use: Yes     Alcohol/week: 12.0 standard drinks of alcohol     Types: 12 Cans of beer per week     Comment: 12 drinks/week    Drug use: Never    Sexual activity: Yes     Partners: Female          Physical Exam  /100   Ht 195.6 cm (77.01\")   Wt 96.2 kg (212 lb)   BMI 25.13 kg/m²     Body mass index is 25.13 kg/m².    Patient is well nourished and well developed.        Ortho Exam  Left Knee  Alignment: Genu varum  Skin: Intact without any erythema, warmth. Trace effusion.  Motion: 0-120° with crepitus.  Tenderness: Medial joint line, harshil/retropatella.  Instability: ACL/PCL/MCL/LCL stable and intact on exam.  Meniscus: Paula's medial " pain.    Patella: Compression/grind positive.  Straight leg raise: Intact.  Motor: Grossly intact Q/HS/TA/GS/EHL/P  Sensory: Grossly intact DP/SP/S/S/T nerve distributions.       Imaging/Labs/EMG Reviewed:  Ordered left knee plain films.  Imaging read/interpreted by Dr. Ugalde.    Left Knee Radiographs  Indication: left knee pain  Views: Standing AP's and skiers of both knees, with lateral and sunrise views of the left knee     Comparison: 7/22/2021     Findings:   Bone-on-bone contact medial compartment, tricompartmental osteophytes, diffuse osteopenia, no acute bony abnormalities.  Mild worsening compared to the previous imaging.       Assessment:  1. Primary osteoarthritis of left knee    2. Chronic pain of left knee        Plan:  Left knee osteoarthritis causing chronic pain (worsening).  Reviewed imaging with the patient--mild worsening.  Patient feels visco series has worked better in past but when he used Supartz.  Placed preauthorization for visco supplementation.  Recommend OTC NSAIDS/pain medication as needed.  Follow up when injections are available to be started.  Questions and concerns answered.      Leticia Bello PA-C  10/30/23  21:57 EDT      Dictated Utilizing Dragon Dictation.

## 2023-12-19 ENCOUNTER — CLINICAL SUPPORT (OUTPATIENT)
Dept: ORTHOPEDIC SURGERY | Facility: CLINIC | Age: 73
End: 2023-12-19
Payer: MEDICARE

## 2023-12-19 DIAGNOSIS — M17.12 PRIMARY OSTEOARTHRITIS OF LEFT KNEE: ICD-10-CM

## 2023-12-19 NOTE — PROGRESS NOTES
Procedure   Large Joint Arthrocentesis: L knee  Date/Time: 12/19/2023 3:01 PM  Consent given by: patient  Site marked: site marked  Timeout: Immediately prior to procedure a time out was called to verify the correct patient, procedure, equipment, support staff and site/side marked as required   Supporting Documentation  Indications: pain   Procedure Details  Location: knee - L knee  Preparation: Patient was prepped and draped in the usual sterile fashion  Needle gauge: 21g.  Approach: anterolateral  Medications administered: 16.8 mg Sodium Hyaluronate (Viscosup) 16.8 MG/2ML  Patient tolerance: patient tolerated the procedure well with no immediate complications

## 2023-12-19 NOTE — PROGRESS NOTES
CC: Follow-up left knee osteoarthritis, 1/3 Gelsyn-3 injection today    History of present illness: Patient presents for his first Gelsyn-3  injection to the left knee today.  At this time the patient denies any numbness or tingling into the distal extremity.  No fever, chills, night sweats or other constitutional symptoms.    See chart for PMH, PSH, Meds, All - reviewed.    Ortho exam:  Left knee  Skin: Intact without redness, warmth or swelling/effusion.  No lesions or evidence of infection noted.  Motor/sensory: Grossly intact L2-S1.    Assessment/plan:  Left knee osteoarthritis    Proceed today with 1/3 of Gelsyn-3 injection.  Patient will follow-up in 1 week for second injection.      After discussing risks of injection the patient gave consent to proceed.  His left knee was confirmed as the correct joint to be injected with a timeout.  The knee was then prepped with Hibiclens and injected with a prefilled syringe of Gelsyn-3 without any resistance using anterior lateral approach, patient in seated position.  The patient tolerated procedure well.  Hemostasis was achieved and a Band-Aid was applied over the injection site.  I instructed the patient on signs and symptoms of infection.  They should report to the ED if any of these develop.  Recommended modifying activity to include rest, ice, elevation and/or heat along with oral pain medication as needed.  Patient observed ambulating normally after the injection.

## 2023-12-26 ENCOUNTER — CLINICAL SUPPORT (OUTPATIENT)
Dept: ORTHOPEDIC SURGERY | Facility: CLINIC | Age: 73
End: 2023-12-26
Payer: MEDICARE

## 2023-12-26 DIAGNOSIS — M17.12 PRIMARY OSTEOARTHRITIS OF LEFT KNEE: Primary | ICD-10-CM

## 2023-12-26 NOTE — PROGRESS NOTES
Procedure   - Large Joint Arthrocentesis: L knee on 12/26/2023 12:45 PM  Indications: pain  Details: 21 G needle, anterolateral approach  Medications: 16.8 mg Sodium Hyaluronate (Viscosup) 16.8 MG/2ML  Outcome: tolerated well, no immediate complications  Procedure, treatment alternatives, risks and benefits explained, specific risks discussed. Consent was given by the patient. Immediately prior to procedure a time out was called to verify the correct patient, procedure, equipment, support staff and site/side marked as required. Patient was prepped and draped in the usual sterile fashion.

## 2023-12-26 NOTE — PROGRESS NOTES
CC: Follow-up left knee osteoarthritis, 2/3 Gelsyn-3 injection today     History of present illness: Patient presents for his second Gelsyn-3  injection to the left knee today.  At this time the patient denies any numbness or tingling into the distal extremity.  No fever, chills, night sweats or other constitutional symptoms.    Patient doing much better with these injections than with the Synvisc.  Last time when he had the visco series (Synvisc) injections he had a lot of swelling following each injection.     See chart for PMH, PSH, Meds, All - reviewed.     Ortho exam:  Left knee  Skin: Intact without redness, warmth or swelling/effusion.  No lesions or evidence of infection noted.  Motor/sensory: Grossly intact L2-S1.     Assessment/plan:  Left knee osteoarthritis     Proceed today with 2/3 of Gelsyn-3 injection.  Patient will follow-up in 1 week for third injection.       After discussing risks of injection the patient gave consent to proceed.  His left knee was confirmed as the correct joint to be injected with a timeout.  The knee was then prepped with Hibiclens and injected with a prefilled syringe of Gelsyn-3 without any resistance using anterior lateral approach, patient in seated position.  The patient tolerated procedure well.  Hemostasis was achieved and a Band-Aid was applied over the injection site.  I instructed the patient on signs and symptoms of infection.  They should report to the ED if any of these develop.  Recommended modifying activity to include rest, ice, elevation and/or heat along with oral pain medication as needed.  Patient observed ambulating normally after the injection.

## 2024-01-02 ENCOUNTER — CLINICAL SUPPORT (OUTPATIENT)
Dept: ORTHOPEDIC SURGERY | Facility: CLINIC | Age: 74
End: 2024-01-02
Payer: MEDICARE

## 2024-01-02 DIAGNOSIS — M17.12 PRIMARY OSTEOARTHRITIS OF LEFT KNEE: Primary | ICD-10-CM

## 2024-01-02 PROCEDURE — 20610 DRAIN/INJ JOINT/BURSA W/O US: CPT | Performed by: PHYSICIAN ASSISTANT

## 2024-01-02 NOTE — PROGRESS NOTES
Procedure   Large Joint Arthrocentesis: L knee  Date/Time: 1/2/2024 2:29 PM  Consent given by: patient  Site marked: site marked  Timeout: Immediately prior to procedure a time out was called to verify the correct patient, procedure, equipment, support staff and site/side marked as required   Supporting Documentation  Indications: pain   Procedure Details  Location: knee - L knee  Preparation: Patient was prepped and draped in the usual sterile fashion  Needle gauge: 21g.  Approach: anterolateral  Medications administered: 16.8 mg Sodium Hyaluronate (Viscosup) 16.8 MG/2ML  Patient tolerance: patient tolerated the procedure well with no immediate complications

## 2024-01-02 NOTE — PROGRESS NOTES
CC: Follow-up left knee osteoarthritis, 3/3 Gelsyn-3 injection today     History of present illness: Patient presents for his third Gelsyn-3  injection to the left knee today.  At this time the patient denies any numbness or tingling into the distal extremity.  No fever, chills, night sweats or other constitutional symptoms.     Continues to do well with the Gelsyn-3 injections.  He does not have the swelling as he did with the Synvisc.       See chart for PMH, PSH, Meds, All - reviewed.     Ortho exam:  Left knee  Skin: Intact without redness, warmth or swelling/effusion.  No lesions or evidence of infection noted.  Motor/sensory: Grossly intact L2-S1.     Assessment/plan:  Left knee osteoarthritis     Proceed today with 3/3 of Gelsyn-3 injection.  Patient will follow-up in 5 months for repeat evaluation and if needed repeat preauthorization for gel series.       After discussing risks of injection the patient gave consent to proceed.  His left knee was confirmed as the correct joint to be injected with a timeout.  The knee was then prepped with Hibiclens and injected with a prefilled syringe of Gelsyn-3 without any resistance using anterior lateral approach, patient in seated position.  The patient tolerated procedure well.  Hemostasis was achieved and a Band-Aid was applied over the injection site.  I instructed the patient on signs and symptoms of infection.  They should report to the ED if any of these develop.  Recommended modifying activity to include rest, ice, elevation and/or heat along with oral pain medication as needed.  Patient observed ambulating normally after the injection.

## 2024-06-11 ENCOUNTER — OFFICE VISIT (OUTPATIENT)
Dept: ORTHOPEDIC SURGERY | Facility: CLINIC | Age: 74
End: 2024-06-11
Payer: MEDICARE

## 2024-06-11 VITALS
HEIGHT: 77 IN | SYSTOLIC BLOOD PRESSURE: 128 MMHG | DIASTOLIC BLOOD PRESSURE: 87 MMHG | BODY MASS INDEX: 25.39 KG/M2 | WEIGHT: 215 LBS

## 2024-06-11 DIAGNOSIS — M16.12 PRIMARY OSTEOARTHRITIS OF LEFT HIP: ICD-10-CM

## 2024-06-11 DIAGNOSIS — M25.552 LEFT HIP PAIN: ICD-10-CM

## 2024-06-11 DIAGNOSIS — M17.12 PRIMARY OSTEOARTHRITIS OF LEFT KNEE: Primary | ICD-10-CM

## 2024-06-11 PROCEDURE — 3079F DIAST BP 80-89 MM HG: CPT | Performed by: PHYSICIAN ASSISTANT

## 2024-06-11 PROCEDURE — 3074F SYST BP LT 130 MM HG: CPT | Performed by: PHYSICIAN ASSISTANT

## 2024-06-11 PROCEDURE — 99213 OFFICE O/P EST LOW 20 MIN: CPT | Performed by: PHYSICIAN ASSISTANT

## 2024-06-11 PROCEDURE — 1160F RVW MEDS BY RX/DR IN RCRD: CPT | Performed by: PHYSICIAN ASSISTANT

## 2024-06-11 PROCEDURE — 1159F MED LIST DOCD IN RCRD: CPT | Performed by: PHYSICIAN ASSISTANT

## 2024-06-11 RX ORDER — CETIRIZINE HYDROCHLORIDE 5 MG/1
5 TABLET ORAL DAILY
COMMUNITY

## 2024-06-11 NOTE — PROGRESS NOTES
"    [facility] [place] Orthopaedic Surgery Clinic Note        Subjective     CC: Follow-up (5 month recheck- Primary osteoarthritis of left knee)      HPI    Dewey Avalos is a 73 y.o. male.  Patient returns today for follow-up of left knee.  He would like to proceed with repeat Visco \"series.  Last series was completed 1/2/2024.  Patient was given Gelsyn-3 which he reported worked better than the Synvisc.  When he had Synvisc in the past it was not as helpful.  He continued to have pain as well as effusions following the injections.  With the Gelsyn-3 he was able to exercise more with HEP to include wall sits, bridges and using weights.    Pain scale: 2/10.  Associated symptoms pain, stiffness and trace effusion/swelling.  Pain is worse with walking and standing.  Resting helps.    Patient also notes left hip/groin pain.    Overall, patient's symptoms are improved with viscosupplementation series.    ROS:    Constiutional:Pt denies fever, chills, nausea, or vomiting.  MSK:as above        Objective      Past Medical History  Past Medical History:   Diagnosis Date    Ankle sprain     Numerous sprains over the years.    Arthritis     Arthritis of back 2012    Back problem     Hip arthrosis 2013    Hypertension     Knee swelling 2014    Lumbosacral disc disease 2018    Periarthritis of shoulder 2013    Plantar fasciitis      Social History     Socioeconomic History    Marital status:    Tobacco Use    Smoking status: Never    Smokeless tobacco: Never   Vaping Use    Vaping status: Never Used   Substance and Sexual Activity    Alcohol use: Yes     Alcohol/week: 12.0 standard drinks of alcohol     Types: 12 Cans of beer per week     Comment: 12 drinks/week    Drug use: Never    Sexual activity: Yes     Partners: Female          Physical Exam  /87   Ht 195.6 cm (77.01\")   Wt 97.5 kg (215 lb)   BMI 25.49 kg/m²     Body mass index is 25.49 kg/m².    Patient is well nourished and well developed.        Ortho Exam  Left " Knee  Alignment: Genu varum  Skin: Intact without any erythema, warmth. Trace effusion.  Motion: 0-120° with crepitus.  Tenderness: Medial joint line positive.  Instability: ACL/PCL/MCL/LCL stable and intact on exam.  Meniscus: Paula's medial pain.    Patella: Compression/grind positive.  Straight leg raise: Intact.  Motor/sensory: Grossly intact L2-S1.    Left hip  Tenderness: Positive to groin.    Testing: Stinchfield positive; passive ER/IR hip negative.      Imaging/Labs/EMG Reviewed and Interpreted:  Ordered left hip plain films.  Imaging read/interpreted by Dr. Burgos.    Imaging Results (Last 24 Hours)       Procedure Component Value Units Date/Time    XR Hip With or Without Pelvis 2 - 3 View Left [[id number]] Resulted: 06/11/24 1603     Updated: 06/11/24 1603    Narrative:      Indication: Left hip pain    Comparison: Todays xrays were compared to previous xrays from 7/10/2023      Impression:           AP pelvis, right hip: Demonstrate a well positioned total hip without   evidence of wear, loosening, fracture or osteolysis, femoral head is   concentrically reduced within the acetabulum and No significant changes   compared to prior radiographs.  AP pelvis, left hip: moderate to severe joint space narrowing,  there are   marginal osteophytes visualized at the femoral head-neck junction and   acetabular margins and Radiographs demonstrate advancing arthritic changes   and wear compared to prior radiographs.              Assessment:  1. Primary osteoarthritis of left knee    2. Left hip pain    3. Primary osteoarthritis of left hip        Plan:  Left knee osteoarthritis--placed preauthorization for repeat viscosupplementation series to left knee.  Needs to be using Gelsyn-3 as it worked well for him back in December 2023/January 2024.  Left hip pain due to osteoarthritis.  Reviewed imaging with the patient--moderate to severe joint space narrowing noted on imaging.  Discussed treatment options for his left  hip.  He has had intra-articular corticosteroid injections to the right hip in the past, prior to proceeding with ALISSON.  If he would like he can proceed with an injection to the left hip. He will think about that for now.  Recommend OTC NSAIDS/pain medication as needed.  Follow up once Visco series has been approved to be started.  Questions and concerns answered.      Leticia Bello PA-C  06/11/24  22:39 EDT      Dictated Utilizing Dragon Dictation.

## 2024-07-15 ENCOUNTER — OFFICE VISIT (OUTPATIENT)
Dept: ORTHOPEDIC SURGERY | Facility: CLINIC | Age: 74
End: 2024-07-15
Payer: MEDICARE

## 2024-07-15 VITALS
BODY MASS INDEX: 25.39 KG/M2 | DIASTOLIC BLOOD PRESSURE: 86 MMHG | SYSTOLIC BLOOD PRESSURE: 170 MMHG | HEIGHT: 77 IN | WEIGHT: 215 LBS

## 2024-07-15 DIAGNOSIS — Z09 POSTOPERATIVE EXAMINATION: ICD-10-CM

## 2024-07-15 DIAGNOSIS — Z96.641 HISTORY OF TOTAL RIGHT HIP REPLACEMENT: Primary | ICD-10-CM

## 2024-07-15 PROCEDURE — 3079F DIAST BP 80-89 MM HG: CPT | Performed by: ORTHOPAEDIC SURGERY

## 2024-07-15 PROCEDURE — 3077F SYST BP >= 140 MM HG: CPT | Performed by: ORTHOPAEDIC SURGERY

## 2024-07-15 PROCEDURE — 1159F MED LIST DOCD IN RCRD: CPT | Performed by: ORTHOPAEDIC SURGERY

## 2024-07-15 PROCEDURE — 99212 OFFICE O/P EST SF 10 MIN: CPT | Performed by: ORTHOPAEDIC SURGERY

## 2024-07-15 PROCEDURE — 1160F RVW MEDS BY RX/DR IN RCRD: CPT | Performed by: ORTHOPAEDIC SURGERY

## 2024-07-15 RX ORDER — LANSOPRAZOLE 30 MG/1
CAPSULE, DELAYED RELEASE ORAL
COMMUNITY
Start: 2024-07-08

## 2024-07-15 NOTE — PROGRESS NOTES
[facility] [place] [facility] [place]    Subjective     Chief Complaint   Patient presents with    Follow-up     Follow up- 1 year status post MODIFIED ANTERIOR TOTAL HIP ARTHROPLASTY - RIGHT - (DOS 6/15/23).        HPI    It has been 1  year(s) since Mr. Avalos's last visit. He returns to clinic today for follow-up of right hip arthroplasty. The issue has been ongoing for 1+ year(s). He rates his pain a 0/10 on the pain scale. Previous/current treatments: physical therapy. Current symptoms:  No pain Overall, he is doing better.  95+ percent improvement compared to his preoperative symptoms.  Fully ambulatory without external aids.    I have reviewed the following portions of the patient's history and agree with: History of Present Illness and Review of Systems    Patient Active Problem List   Diagnosis    Primary osteoarthritis of right hip    Status post right hip replacement    HTN (hypertension)    Hypothyroidism (acquired)     Past Medical History:   Diagnosis Date    Ankle sprain     Numerous sprains over the years.    Arthritis     Arthritis of back 2012    Back problem     Hip arthrosis 2013    Hypertension     Knee swelling 2014    Lumbosacral disc disease 2018    Periarthritis of shoulder 2013    Plantar fasciitis       Past Surgical History:   Procedure Laterality Date    CHOLECYSTECTOMY  01/23/2023    COLONOSCOPY      HIP SURGERY  06/15/2023    KNEE SURGERY Left 03/28/2014    Deshawn Proctor in [place] performed knee scope    TONSILLECTOMY      TOTAL HIP ARTHROPLASTY Right 06/15/2023    Procedure: MODIFIED ANTERIOR TOTAL HIP ARTHROPLASTY - RIGHT;  Surgeon: Damien Ugalde MD;  [facility]: [facility];  Service: Orthopedics;  Laterality: Right;      Family History   Problem Relation Age of Onset    Arthritis Mother     Heart disease Father     Arthritis Father     Diabetes Brother     Cancer Maternal Grandmother     Diabetes Paternal Grandfather     Diabetes Brother      Social History      Socioeconomic History    Marital status:    Tobacco Use    Smoking status: Never    Smokeless tobacco: Never   Vaping Use    Vaping status: Never Used   Substance and Sexual Activity    Alcohol use: Yes     Alcohol/week: 12.0 standard drinks of alcohol     Types: 12 Cans of beer per week     Comment: 12 drinks/week    Drug use: Never    Sexual activity: Yes     Partners: Female      Current Outpatient Medications on File Prior to Visit   Medication Sig Dispense Refill    aspirin 81 MG chewable tablet Chew 1 tablet Daily.      cetirizine (zyrTEC) 5 MG tablet Take 1 tablet by mouth Daily.      doxazosin (CARDURA) 2 MG tablet       famotidine (PEPCID) 40 MG tablet Take 1 tablet by mouth Daily.      lansoprazole (PREVACID) 30 MG capsule       losartan-hydrochlorothiazide (HYZAAR) 100-25 MG per tablet Take 1 tablet by mouth Daily.      metroNIDAZOLE (METROCREAM) 0.75 % cream       multivitamin with minerals tablet tablet Take 1 tablet by mouth Daily.      Synthroid 25 MCG tablet Take 1 tablet by mouth Every Morning. PT HASN'T STARTED TAKING YET.      [DISCONTINUED] pantoprazole (PROTONIX) 40 MG EC tablet Take 1 tablet by mouth Daily.       No current facility-administered medications on file prior to visit.      No Known Allergies     Review of Systems   Constitutional:  Negative for activity change, appetite change, chills, diaphoresis, fatigue, fever and unexpected weight change.   HENT:  Negative for congestion, dental problem, drooling, ear discharge, ear pain, facial swelling, hearing loss, mouth sores, nosebleeds, postnasal drip, rhinorrhea, sinus pressure, sneezing, sore throat, tinnitus, trouble swallowing and voice change.    Eyes:  Negative for photophobia, pain, discharge, redness, itching and visual disturbance.   Respiratory:  Negative for apnea, cough, choking, chest tightness, shortness of breath, wheezing and stridor.    Cardiovascular:  Negative for chest pain, palpitations and leg swelling.  "  Gastrointestinal:  Negative for abdominal distention, abdominal pain, anal bleeding, blood in stool, constipation, diarrhea, nausea, rectal pain and vomiting.   Endocrine: Negative for cold intolerance, heat intolerance, polydipsia, polyphagia and polyuria.   Genitourinary:  Negative for decreased urine volume, difficulty urinating, dysuria, enuresis, flank pain, frequency, genital sores, hematuria and urgency.   Musculoskeletal:  Positive for arthralgias. Negative for back pain, gait problem, joint swelling, myalgias, neck pain and neck stiffness.   Skin:  Negative for color change, pallor, rash and wound.   Allergic/Immunologic: Negative for environmental allergies, food allergies and immunocompromised state.   Neurological:  Negative for dizziness, tremors, seizures, syncope, facial asymmetry, speech difficulty, weakness, light-headedness, numbness and headaches.   Hematological:  Negative for adenopathy. Does not bruise/bleed easily.   Psychiatric/Behavioral:  Negative for agitation, behavioral problems, confusion, decreased concentration, dysphoric mood, hallucinations, self-injury, sleep disturbance and suicidal ideas. The patient is not nervous/anxious and is not hyperactive.         Objective      Physical Exam  /86   Ht 195.6 cm (77.01\")   Wt 97.5 kg (215 lb)   BMI 25.49 kg/m²     Body mass index is 25.49 kg/m².  BMI is >= 25 and <30. (Overweight) The following options were offered after discussion;: none (medical contraindication)      General:   Mental Status:  Alert   Appearance: Cooperative, in no acute distress   Build and Nutrition: Well-nourished well-developed male   Orientation: Alert and oriented to person, place and time   Posture: Normal   Gait: Nonantalgic    Integument:   Right hip: Wound is well-healed with no signs of infection    Lower Extremity:   Right Hip:    Tenderness:  None    Swelling:  None    Crepitus:  None    Range of motion:  External Rotation: 30°       Internal " Rotation: 30°       Flexion:  100°       Extension:  0°    Deformities:  None  Functional testing: Negative Stinchfield    No leg length discrepancy      Imaging/Studies  Imaging Results (Last 24 Hours)       Procedure Component Value Units Date/Time    XR Hip With or Without Pelvis 2 - 3 View Right [[id number]] Resulted: 07/15/24 1455     Updated: 07/15/24 1456    Narrative:      Right Hip Radiographs  Indication: status-post right total hip arthroplasty  Views: low AP pelvis and lateral of the right hip    Comparison: no change compared to prior study, 7/10/2023    Findings:   The components are well aligned, with no signs of loosening or failure.                 Assessment and Plan     Diagnoses and all orders for this visit:    1. History of total right hip replacement (Primary)  -     XR Hip With or Without Pelvis 2 - 3 View Right    2. Postoperative examination        1. History of total right hip replacement    2. Postoperative examination        I reviewed my findings with the patient.  His right total hip arthroplasty is functioning well, and I will see him back in 4 years for what will be a 5-year checkup.  I will be happy to see him back sooner for any problems.  I will also be happy to see him back if his left hip starts to bother him more.    Return in about 4 years (around 7/15/2028) for recheck with x-rays.      Damien Ugalde MD  07/15/24  15:04 EDT    Dictated Utilizing Dragon Dictation

## 2024-08-20 ENCOUNTER — CLINICAL SUPPORT (OUTPATIENT)
Dept: ORTHOPEDIC SURGERY | Facility: CLINIC | Age: 74
End: 2024-08-20
Payer: MEDICARE

## 2024-08-20 DIAGNOSIS — M17.12 PRIMARY OSTEOARTHRITIS OF LEFT KNEE: ICD-10-CM

## 2024-08-20 PROCEDURE — 20610 DRAIN/INJ JOINT/BURSA W/O US: CPT | Performed by: PHYSICIAN ASSISTANT

## 2024-08-20 NOTE — PROGRESS NOTES
Procedure   - Large Joint Arthrocentesis: L knee on 8/20/2024 1:55 PM  Indications: pain  Details: 21 G needle, anterolateral approach  Medications: 16.8 mg Sodium Hyaluronate (Viscosup) 16.8 MG/2ML  Outcome: tolerated well, no immediate complications  Procedure, treatment alternatives, risks and benefits explained, specific risks discussed. Consent was given by the patient. Immediately prior to procedure a time out was called to verify the correct patient, procedure, equipment, support staff and site/side marked as required. Patient was prepped and draped in the usual sterile fashion.

## 2024-08-27 ENCOUNTER — CLINICAL SUPPORT (OUTPATIENT)
Dept: ORTHOPEDIC SURGERY | Facility: CLINIC | Age: 74
End: 2024-08-27
Payer: MEDICARE

## 2024-08-27 DIAGNOSIS — M17.12 PRIMARY OSTEOARTHRITIS OF LEFT KNEE: Primary | ICD-10-CM

## 2024-08-27 PROCEDURE — 20610 DRAIN/INJ JOINT/BURSA W/O US: CPT | Performed by: PHYSICIAN ASSISTANT

## 2024-08-27 RX ORDER — BISOPROLOL FUMARATE 5 MG/1
TABLET, FILM COATED ORAL
COMMUNITY
Start: 2024-07-24

## 2024-08-27 NOTE — PROGRESS NOTES
Procedure   - Large Joint Arthrocentesis: L knee on 8/27/2024 2:28 PM  Indications: pain  Details: 21 G needle, anterolateral approach  Medications: 16.8 mg Sodium Hyaluronate (Viscosup) 16.8 MG/2ML  Outcome: tolerated well, no immediate complications  Procedure, treatment alternatives, risks and benefits explained, specific risks discussed. Consent was given by the patient. Immediately prior to procedure a time out was called to verify the correct patient, procedure, equipment, support staff and site/side marked as required. Patient was prepped and draped in the usual sterile fashion.

## 2024-08-27 NOTE — PROGRESS NOTES
CC: Follow-up left knee osteoarthritis, 2/3 Gelsyn-3 injection today     History of present illness: Patient presents for his second Gelsyn-3  injection to the left knee today.  At this time the patient denies any numbness or tingling into the distal extremity.  No fever, chills, night sweats or other constitutional symptoms.     Notes some present since last week.    See chart for PMH, PSH, Meds, All - reviewed.     Ortho exam:  Left knee  Skin: Intact without redness, warmth or swelling/effusion.  No lesions or evidence of infection noted.  Motor/sensory: Grossly intact L2-S1.     Assessment/plan:  Left knee osteoarthritis     Proceed today with 2/3 of Gelsyn-3 injection.  Patient will follow-up in 1 week for third injection.       After discussing risks of injection the patient gave consent to proceed.  His left knee was confirmed as the correct joint to be injected with a timeout.  The knee was then prepped with Hibiclens and injected with a prefilled syringe of Gelsyn-3 without any resistance using anterior lateral approach, patient in seated position.  The patient tolerated procedure well.  Hemostasis was achieved and a Band-Aid was applied over the injection site.  I instructed the patient on signs and symptoms of infection.  They should report to the ED if any of these develop.  Recommended modifying activity to include rest, ice, elevation and/or heat along with oral pain medication as needed.  Patient observed ambulating normally after the injection.

## 2024-09-03 ENCOUNTER — CLINICAL SUPPORT (OUTPATIENT)
Dept: ORTHOPEDIC SURGERY | Facility: CLINIC | Age: 74
End: 2024-09-03
Payer: MEDICARE

## 2024-09-03 DIAGNOSIS — M17.12 PRIMARY OSTEOARTHRITIS OF LEFT KNEE: Primary | ICD-10-CM

## 2024-09-03 PROCEDURE — 20610 DRAIN/INJ JOINT/BURSA W/O US: CPT | Performed by: PHYSICIAN ASSISTANT

## 2024-09-03 NOTE — PROGRESS NOTES
Procedure   - Large Joint Arthrocentesis: L knee on 9/3/2024 2:00 PM  Indications: pain  Details: 21 G needle, anterolateral approach  Medications: 16.8 mg Sodium Hyaluronate (Viscosup) 16.8 MG/2ML  Outcome: tolerated well, no immediate complications  Procedure, treatment alternatives, risks and benefits explained, specific risks discussed. Consent was given by the patient. Immediately prior to procedure a time out was called to verify the correct patient, procedure, equipment, support staff and site/side marked as required. Patient was prepped and draped in the usual sterile fashion.

## 2024-09-03 NOTE — PROGRESS NOTES
CC: Follow-up left knee osteoarthritis, 3/3 Gelsyn-3 injection today     History of present illness: Patient presents for his third Gelsyn-3  injection to the left knee today.  At this time the patient denies any numbness or tingling into the distal extremity.  No fever, chills, night sweats or other constitutional symptoms.     Feels knee continues to improve with each injection.     See chart for PMH, PSH, Meds, All - reviewed.     Ortho exam:  Left knee  Skin: Intact without redness, warmth or swelling/effusion.  No lesions or evidence of infection noted.  Motor/sensory: Grossly intact L2-S1.     Assessment/plan:  Left knee osteoarthritis     Proceed today with 3/3 of Gelsyn-3 injection.  Patient will follow-up in 5 months for updated exam and imaging.       After discussing risks of injection the patient gave consent to proceed.  His left knee was confirmed as the correct joint to be injected with a timeout.  The knee was then prepped with Hibiclens and injected with a prefilled syringe of Gelsyn-3 without any resistance using anterior lateral approach, patient in seated position.  The patient tolerated procedure well.  Hemostasis was achieved and a Band-Aid was applied over the injection site.  I instructed the patient on signs and symptoms of infection.  They should report to the ED if any of these develop.  Recommended modifying activity to include rest, ice, elevation and/or heat along with oral pain medication as needed.  Patient observed ambulating normally after the injection.

## 2025-02-10 ENCOUNTER — OFFICE VISIT (OUTPATIENT)
Dept: ORTHOPEDIC SURGERY | Facility: CLINIC | Age: 75
End: 2025-02-10
Payer: MEDICARE

## 2025-02-10 VITALS
BODY MASS INDEX: 27.51 KG/M2 | DIASTOLIC BLOOD PRESSURE: 80 MMHG | WEIGHT: 233 LBS | HEIGHT: 77 IN | SYSTOLIC BLOOD PRESSURE: 126 MMHG

## 2025-02-10 DIAGNOSIS — M25.562 CHRONIC PAIN OF LEFT KNEE: ICD-10-CM

## 2025-02-10 DIAGNOSIS — G89.29 CHRONIC PAIN OF LEFT KNEE: ICD-10-CM

## 2025-02-10 DIAGNOSIS — M17.12 PRIMARY OSTEOARTHRITIS OF LEFT KNEE: Primary | ICD-10-CM

## 2025-02-10 NOTE — PROGRESS NOTES
"    [facility] [place] [facility] Clinic Note        Subjective     CC: Left knee pain    HPI    Dewey Avalos is a 74 y.o. male.  Patient returns today for follow-up exam and imaging in order to proceed with repeat viscosupplementation series.  Last completed series 9/3/2024.  Currently using Gelsyn-3 which are working well for him.  Patient has also received Synvisc but it caused increased pain and swelling to the knee.    Pain scale: 2/10. Associated symptoms pain, stiffness, mild swelling. Pain is worse with walking and standing. Resting helps.  No reported numbness or tingling into the extremity.    Overall, patient's symptoms are improved with viscosupplementation series.    ROS:    Constiutional:Pt denies fever, chills, nausea, or vomiting.  MSK:as above        Objective      Past Medical History  Past Medical History:   Diagnosis Date    Ankle sprain     Numerous sprains over the years.    Arthritis     Arthritis of back 2012    Back problem     Hip arthrosis 2013    Hypertension     Knee swelling 2014    Lumbosacral disc disease 2018    Periarthritis of shoulder 2013    Plantar fasciitis      Social History     Socioeconomic History    Marital status:    Tobacco Use    Smoking status: Never    Smokeless tobacco: Never   Vaping Use    Vaping status: Never Used   Substance and Sexual Activity    Alcohol use: Yes     Alcohol/week: 12.0 standard drinks of alcohol     Types: 12 Cans of beer per week     Comment: 12 drinks/week    Drug use: Never    Sexual activity: Yes     Partners: Female          Physical Exam  /80   Ht 195.6 cm (77.01\")   Wt 106 kg (233 lb)   BMI 27.62 kg/m²     Body mass index is 27.62 kg/m².    Patient is well nourished and well developed.        Ortho Exam  Left Knee  Alignment: Genu varum  Skin: Intact without any erythema, warmth. Trace effusion.  Motion: 0-120° with crepitus.  Tenderness: Medial joint line positive.  Instability: ACL/PCL/MCL/LCL intact, without " laxity.  Meniscus: Paula's medial pain.    Patella: Compression/grind positive.  Straight leg raise: Intact.  Motor/sensory: Grossly intact L2-S1.      Imaging/Labs/EMG Reviewed and Interpreted:  Ordered left knee plain films.  Imaging read/interpreted by Dr. Ugalde.      Imaging Results (Last 24 Hours)       Procedure Component Value Units Date/Time    XR Knee 4+ View Left [[id number]] Resulted: 02/10/25 1155     Updated: 02/10/25 1155    Narrative:      Left Knee Radiographs  Indication: left knee pain  Views: Standing AP's and skiers of both knees, with lateral and sunrise   views of the left knee    Comparison: no prior studies available    Findings:    Bone-on-bone contact medial compartment, tricompartmental osteophytes,   bone-on-bone contact patellofemoral joint, varus alignment, no acute bony   abnormalities.  Advanced knee arthritis.              Assessment:  1. Primary osteoarthritis of left knee    2. Chronic pain of left knee        Plan:  Left knee osteoarthritis causing pain.  Placed preauthorization for viscosupplementation series to left knee--Gelsyn-3 works well for him.  Recommend OTC NSAIDS/pain medication as needed.  Follow up when Visco series has been approved to be started--should be on or after 3/4/2025.  Questions and concerns answered.      Leticia Bello PA-C  02/10/25  21:01 EST      Dictated Utilizing Dragon Dictation.

## 2025-03-31 ENCOUNTER — TRANSCRIBE ORDERS (OUTPATIENT)
Dept: ADMINISTRATIVE | Facility: HOSPITAL | Age: 75
End: 2025-03-31
Payer: MEDICARE

## 2025-03-31 DIAGNOSIS — Z82.49 FH: CORONARY ARTERY DISEASE: Primary | ICD-10-CM

## 2025-04-14 ENCOUNTER — CLINICAL SUPPORT (OUTPATIENT)
Dept: ORTHOPEDIC SURGERY | Facility: CLINIC | Age: 75
End: 2025-04-14
Payer: MEDICARE

## 2025-04-14 DIAGNOSIS — M17.12 PRIMARY OSTEOARTHRITIS OF LEFT KNEE: ICD-10-CM

## 2025-04-14 PROCEDURE — 20610 DRAIN/INJ JOINT/BURSA W/O US: CPT | Performed by: PHYSICIAN ASSISTANT

## 2025-04-14 RX ORDER — SPIRONOLACTONE 25 MG/1
1 TABLET ORAL DAILY
COMMUNITY
Start: 2025-03-28

## 2025-04-14 RX ORDER — ESOMEPRAZOLE MAGNESIUM 40 MG/1
CAPSULE, DELAYED RELEASE ORAL
COMMUNITY
Start: 2025-02-10

## 2025-04-14 RX ORDER — DOXAZOSIN 4 MG/1
TABLET ORAL
COMMUNITY
Start: 2025-03-01

## 2025-04-14 NOTE — PROGRESS NOTES
Procedure   - Large Joint Arthrocentesis on 4/14/2025 10:58 AM  Indications: pain  Details: 21 G needle, anterolateral approach  Medications: 16.8 mg Sodium Hyaluronate (Viscosup) 16.8 MG/2ML  Outcome: tolerated well, no immediate complications  Procedure, treatment alternatives, risks and benefits explained, specific risks discussed. Consent was given by the patient. Immediately prior to procedure a time out was called to verify the correct patient, procedure, equipment, support staff and site/side marked as required. Patient was prepped and draped in the usual sterile fashion.

## 2025-04-21 ENCOUNTER — CLINICAL SUPPORT (OUTPATIENT)
Dept: ORTHOPEDIC SURGERY | Facility: CLINIC | Age: 75
End: 2025-04-21
Payer: MEDICARE

## 2025-04-21 DIAGNOSIS — M17.12 PRIMARY OSTEOARTHRITIS OF LEFT KNEE: Primary | ICD-10-CM

## 2025-04-21 PROCEDURE — 20610 DRAIN/INJ JOINT/BURSA W/O US: CPT | Performed by: PHYSICIAN ASSISTANT

## 2025-04-21 NOTE — PROGRESS NOTES
Procedure   - Large Joint Arthrocentesis: L knee on 4/21/2025 10:48 AM  Indications: pain  Details: 21 G needle, anterolateral approach  Medications: 16.8 mg Sodium Hyaluronate (Viscosup) 16.8 MG/2ML  Outcome: tolerated well, no immediate complications  Procedure, treatment alternatives, risks and benefits explained, specific risks discussed. Consent was given by the patient. Immediately prior to procedure a time out was called to verify the correct patient, procedure, equipment, support staff and site/side marked as required. Patient was prepped and draped in the usual sterile fashion.

## 2025-04-21 NOTE — PROGRESS NOTES
CC: Follow-up left knee osteoarthritis, 2/3 Gelsyn-3 injection today     History of present illness: Patient presents for his second Gelsyn-3  injection to the left knee today.  At this time the patient denies any numbness or tingling into the distal extremity.  No fever, chills, night sweats or other constitutional symptoms.     Improved since starting.     See chart for PMH, PSH, Meds, All - reviewed.     Ortho exam:  Left knee  Skin: Intact without redness, warmth or swelling/effusion.  No lesions or evidence of infection noted.  Motor/sensory: Grossly intact L2-S1.     Assessment/plan:  Left knee osteoarthritis     Proceed today with 2/3 of Gelsyn-3 injection.  Patient will follow-up in 1 week for third injection.       After discussing risks of injection the patient gave consent to proceed.  His left knee was confirmed as the correct joint to be injected with a timeout.  The knee was then prepped with Hibiclens and injected with a prefilled syringe of Gelsyn-3 without any resistance using anterior lateral approach, patient in seated position.  The patient tolerated procedure well.  Hemostasis was achieved and a Band-Aid was applied over the injection site.  I instructed the patient on signs and symptoms of infection.  They should report to the ED if any of these develop.  Recommended modifying activity to include rest, ice, elevation and/or heat along with oral pain medication as needed.  Patient observed ambulating normally after the injection.

## 2025-04-28 ENCOUNTER — CLINICAL SUPPORT (OUTPATIENT)
Dept: ORTHOPEDIC SURGERY | Facility: CLINIC | Age: 75
End: 2025-04-28
Payer: MEDICARE

## 2025-04-28 DIAGNOSIS — M17.12 PRIMARY OSTEOARTHRITIS OF LEFT KNEE: Primary | ICD-10-CM

## 2025-04-28 PROCEDURE — 20610 DRAIN/INJ JOINT/BURSA W/O US: CPT | Performed by: PHYSICIAN ASSISTANT

## 2025-04-28 NOTE — PROGRESS NOTES
CC: Follow-up left knee osteoarthritis, 3/3 Gelsyn-3 injection today     History of present illness: Patient presents for his third Gelsyn-3  injection to the left knee today.  At this time the patient denies any numbness or tingling into the distal extremity.  No fever, chills, night sweats or other constitutional symptoms.     Feels knee continues to improve with each injection.     See chart for PMH, PSH, Meds, All - reviewed.     Ortho exam:  Left knee  Skin: Intact without redness, warmth or swelling/effusion.  No lesions or evidence of infection noted.  Motor/sensory: Grossly intact L2-S1.     Assessment/plan:  Left knee osteoarthritis     Proceed today with 3/3 of Gelsyn-3 injection.  Patient will follow-up as needed.       After discussing risks of injection the patient gave consent to proceed.  His left knee was confirmed as the correct joint to be injected with a timeout.  The knee was then prepped with Hibiclens and injected with a prefilled syringe of Gelsyn-3 without any resistance using anterior lateral approach, patient in seated position.  The patient tolerated procedure well.  Hemostasis was achieved and a Band-Aid was applied over the injection site.  I instructed the patient on signs and symptoms of infection.  They should report to the ED if any of these develop.  Recommended modifying activity to include rest, ice, elevation and/or heat along with oral pain medication as needed.  Patient observed ambulating normally after the injection.

## 2025-04-28 NOTE — PROGRESS NOTES
Procedure   Large Joint Arthrocentesis: L knee  Date/Time: 4/28/2025 10:50 AM  Consent given by: patient  Site marked: site marked  Timeout: Immediately prior to procedure a time out was called to verify the correct patient, procedure, equipment, support staff and site/side marked as required   Supporting Documentation  Indications: pain   Procedure Details  Location: knee - L knee  Preparation: Patient was prepped and draped in the usual sterile fashion  Needle gauge: 21g.  Approach: anterolateral  Medications administered: 16.8 mg Sodium Hyaluronate (Viscosup) 16.8 MG/2ML  Patient tolerance: patient tolerated the procedure well with no immediate complications

## 2025-05-28 PROBLEM — Z82.49 FAMILY HISTORY OF CORONARY ARTERIOSCLEROSIS: Status: ACTIVE | Noted: 2025-05-28

## 2025-05-28 PROBLEM — Z82.49 FAMILY HISTORY OF ABDOMINAL AORTIC ANEURYSM (AAA): Status: ACTIVE | Noted: 2025-05-28

## 2025-05-28 PROBLEM — N40.0 BENIGN PROSTATIC HYPERPLASIA: Status: ACTIVE | Noted: 2025-05-28

## 2025-05-28 PROBLEM — K21.9 GERD (GASTROESOPHAGEAL REFLUX DISEASE): Status: ACTIVE | Noted: 2025-05-28

## 2025-06-02 ENCOUNTER — OFFICE VISIT (OUTPATIENT)
Dept: CARDIOLOGY | Facility: CLINIC | Age: 75
End: 2025-06-02
Payer: MEDICARE

## 2025-06-02 VITALS
BODY MASS INDEX: 26.8 KG/M2 | WEIGHT: 227 LBS | HEIGHT: 77 IN | DIASTOLIC BLOOD PRESSURE: 78 MMHG | HEART RATE: 66 BPM | SYSTOLIC BLOOD PRESSURE: 128 MMHG | OXYGEN SATURATION: 99 %

## 2025-06-02 DIAGNOSIS — R06.09 DYSPNEA ON EXERTION: ICD-10-CM

## 2025-06-02 DIAGNOSIS — Z82.49 FAMILY HISTORY OF ABDOMINAL AORTIC ANEURYSM (AAA): ICD-10-CM

## 2025-06-02 DIAGNOSIS — Z82.49 FAMILY HISTORY OF CORONARY ARTERIOSCLEROSIS: ICD-10-CM

## 2025-06-02 DIAGNOSIS — I10 PRIMARY HYPERTENSION: ICD-10-CM

## 2025-06-02 DIAGNOSIS — R07.89 OTHER CHEST PAIN: ICD-10-CM

## 2025-06-02 DIAGNOSIS — R00.1 BRADYCARDIA, SINUS: ICD-10-CM

## 2025-06-02 DIAGNOSIS — R07.2 PRECORDIAL CHEST PAIN: Primary | ICD-10-CM

## 2025-06-02 DIAGNOSIS — R06.09 DYSPNEA ON EXERTION: Chronic | ICD-10-CM

## 2025-06-02 DIAGNOSIS — I44.0 1ST DEGREE AV BLOCK: Primary | ICD-10-CM

## 2025-06-02 PROBLEM — G56.03 BILATERAL CARPAL TUNNEL SYNDROME: Status: ACTIVE | Noted: 2025-06-02

## 2025-06-02 PROBLEM — E55.9 VITAMIN D DEFICIENCY: Status: ACTIVE | Noted: 2025-06-02

## 2025-06-02 PROBLEM — R33.8 ACUTE RETENTION OF URINE: Status: ACTIVE | Noted: 2023-06-17

## 2025-06-02 PROBLEM — M54.32 SCIATICA OF LEFT SIDE: Status: ACTIVE | Noted: 2025-06-02

## 2025-06-02 PROBLEM — M15.0 PRIMARY OSTEOARTHRITIS INVOLVING MULTIPLE JOINTS: Status: ACTIVE | Noted: 2025-06-02

## 2025-06-02 RX ORDER — SODIUM CHLORIDE 0.9 % (FLUSH) 0.9 %
10 SYRINGE (ML) INJECTION EVERY 12 HOURS SCHEDULED
OUTPATIENT
Start: 2025-06-02

## 2025-06-02 RX ORDER — DOXYCYCLINE 100 MG/1
CAPSULE ORAL
COMMUNITY
Start: 2025-05-29

## 2025-06-02 RX ORDER — SODIUM CHLORIDE 0.9 % (FLUSH) 0.9 %
10 SYRINGE (ML) INJECTION AS NEEDED
OUTPATIENT
Start: 2025-06-02

## 2025-06-02 RX ORDER — METOPROLOL TARTRATE 25 MG/1
100 TABLET, FILM COATED ORAL ONCE
OUTPATIENT
Start: 2025-06-02

## 2025-06-02 RX ORDER — METOPROLOL TARTRATE 50 MG
50 TABLET ORAL
OUTPATIENT
Start: 2025-06-02

## 2025-06-02 RX ORDER — NITROGLYCERIN 0.4 MG/1
0.8 TABLET SUBLINGUAL
OUTPATIENT
Start: 2025-06-02

## 2025-06-02 RX ORDER — METOPROLOL TARTRATE 1 MG/ML
5 INJECTION, SOLUTION INTRAVENOUS
OUTPATIENT
Start: 2025-06-02

## 2025-06-02 RX ORDER — SODIUM CHLORIDE 9 MG/ML
40 INJECTION, SOLUTION INTRAVENOUS AS NEEDED
OUTPATIENT
Start: 2025-06-02

## 2025-06-02 RX ORDER — METOPROLOL TARTRATE 25 MG/1
150 TABLET, FILM COATED ORAL ONCE
OUTPATIENT
Start: 2025-06-02

## 2025-06-02 RX ORDER — METOPROLOL TARTRATE 25 MG/1
200 TABLET, FILM COATED ORAL ONCE
OUTPATIENT
Start: 2025-06-02 | End: 2025-06-02

## 2025-06-02 RX ORDER — NITROGLYCERIN 0.4 MG/1
0.4 TABLET SUBLINGUAL
OUTPATIENT
Start: 2025-06-02 | End: 2025-06-02

## 2025-06-02 RX ORDER — IVABRADINE 5 MG/1
15 TABLET, FILM COATED ORAL ONCE
OUTPATIENT
Start: 2025-06-02 | End: 2025-06-02

## 2025-06-02 RX ORDER — METOPROLOL TARTRATE 25 MG/1
50 TABLET, FILM COATED ORAL ONCE
OUTPATIENT
Start: 2025-06-02

## 2025-06-02 NOTE — PROGRESS NOTES
OFFICE VISIT  NOTE  [facility] CARDIOLOGY      Name: Dewey Avalos    Date: 2025  MRN:  [id number]  :  1950      REFERRING/PRIMARY PROVIDER:  Hayden Bahena MD    Chief Complaint   Patient presents with    Shortness of Breath       HPI: Dewey Avalos is a 74 y.o. male who presents for dyspnea on exertion and chest discomfort.  Symptoms started about 2 to 3 months ago when he got warm and he started walking outside, mainly hills and going up stairs cause increasing shortness of breath which is unusual for him also with some substernal chest tightness and pressure, no pain.  No previous cardiac history.  Difficult to control hypertension, finally well-controlled usually about 128/78 blood pressure on bisoprolol 2.5 mg daily, losartan, HCTZ, and spironolactone along with doxazosin.    Past Medical History:   Diagnosis Date    Ankle sprain     Numerous sprains over the years.    Arthritis     Arthritis of back     Back problem     CTS (carpal tunnel syndrome)     Hip arthrosis     Hypertension     Knee swelling     Lumbosacral disc disease 2018    Medial meniscus tear 3/20/2014    Periarthritis of shoulder 2013    Plantar fasciitis        Past Surgical History:   Procedure Laterality Date    CHOLECYSTECTOMY  2023    COLONOSCOPY      HAND SURGERY      HIP SURGERY  06/15/2023    KNEE SURGERY Left 2014    Deshawn Proctor in [place] performed knee scope    TONSILLECTOMY      TOTAL HIP ARTHROPLASTY Right 06/15/2023    Procedure: MODIFIED ANTERIOR TOTAL HIP ARTHROPLASTY - RIGHT;  Surgeon: Damien Ugalde MD;  Location: [facility];  Service: Orthopedics;  Laterality: Right;       Social History     Socioeconomic History    Marital status:    Tobacco Use    Smoking status: Never    Smokeless tobacco: Never   Vaping Use    Vaping status: Never Used   Substance and Sexual Activity    Alcohol use: Yes     Alcohol/week: 12.0 standard  drinks of alcohol     Types: 12 Cans of beer per week     Comment: 12 drinks/week    Drug use: Never    Sexual activity: Yes     Partners: Female     Birth control/protection: None       Family History   Problem Relation Age of Onset    Arthritis Mother     Heart disease Father     Arthritis Father     Diabetes Brother     Cancer Maternal Grandmother     Diabetes Paternal Grandfather     Diabetes Brother     Diabetes Brother         ROS:   Constitutional no fever,  no weight loss   Skin no rash, no subcutaneous nodules   Otolaryngeal no difficulty swallowing   Cardiovascular See HPI   Pulmonary no cough, no sputum production   Gastrointestinal no constipation, no diarrhea   Genitourinary no dysuria, no hematuria   Hematologic no easy bruisability, no abnormal bleeding   Musculoskeletal no muscle pain   Neurologic no dizziness, no falls         No Known Allergies      Current Outpatient Medications:     aspirin 81 MG chewable tablet, Chew 1 tablet Daily., Disp: , Rfl:     bisoprolol (ZEBeta) 5 MG tablet, , Disp: , Rfl:     cetirizine (zyrTEC) 5 MG tablet, Take 1 tablet by mouth Daily., Disp: , Rfl:     doxazosin (CARDURA) 4 MG tablet, , Disp: , Rfl:     doxycycline (VIBRAMYCIN) 100 MG capsule, Take 1 capsule twice a day by oral route for 10 days., Disp: , Rfl:     esomeprazole (nexIUM) 40 MG capsule, , Disp: , Rfl:     famotidine (PEPCID) 40 MG tablet, Take 1 tablet by mouth Daily., Disp: , Rfl:     losartan-hydrochlorothiazide (HYZAAR) 100-25 MG per tablet, Take 1 tablet by mouth Daily., Disp: , Rfl:     metroNIDAZOLE (METROCREAM) 0.75 % cream, , Disp: , Rfl:     multivitamin with minerals tablet tablet, Take 1 tablet by mouth Daily., Disp: , Rfl:     spironolactone (ALDACTONE) 25 MG tablet, Take 1 tablet by mouth Daily., Disp: , Rfl:     Synthroid 25 MCG tablet, Take 1 tablet by mouth Every Morning. PT HASN'T STARTED TAKING YET., Disp: , Rfl:     Vitals:    06/02/25 1513   BP: 128/78   Pulse: 66   SpO2: 99%  "  Weight: 103 kg (227 lb)   Height: 195.6 cm (77\")     Body mass index is 26.92 kg/m².    PHYSICAL EXAM:    General Appearance:   well developed  well nourished  HENT:   oropharynx moist  lips not cyanotic  Neck:  thyroid not enlarged  supple  Respiratory:  no respiratory distress  normal breath sounds  no rales  Cardiovascular:  no jugular venous distention  regular rhythm  apical impulse normal  S1 normal, S2 normal  no S3, no S4   no murmur  no rub, no thrill  carotid pulses normal; no bruit  lower extremity edema: none      Musculoskeletal:  no clubbing of fingers.   normocephalic, head atraumatic  Skin:   warm, dry  Psychiatric:  judgement and insight appropriate  normal mood and affect    RESULTS:   Procedures          Labs:  No results found for: \"CHOL\", \"TRIG\", \"HDL\", \"LDL\", \"AST\", \"ALT\"  Lab Results   Component Value Date    HGBA1C 5.50 06/01/2023     No components found for: \"CREATINININE\"  No results found for: \"EGFRIFNONA\"    Most recent PCP note, imaging tests, and labs reviewed.    ASSESSMENT:  Problem List Items Addressed This Visit       Primary hypertension (Chronic)    Dyspnea on exertion (Chronic)    Bradycardia, sinus     Other Visit Diagnoses         Precordial chest pain    -  Primary            PLAN:    1.  Dyspnea on exertion with chest discomfort:  Given risk factors and age we will proceed with coronary CTA  Discontinue coronary calcium score  Check echocardiogram  The patient was advised to call 911 if chest pain worsens or persists despite rest or nitroglycerin if available, and to avoid strenuous activity until further notice.    2.  Difficult to control hypertension:  Goal blood pressure less than 130/80  Continue bisoprolol, losartan/HCTZ, and spironolactone along with doxazosin.  If blood pressure becomes uncontrolled I would pursue a secondary workup with cortisol, renin/aldosterone, metanephrines, renal artery duplex.    3.  Hyperlipidemia:  Goal based on findings of coronary " CTA.    Advance Care Planning   ACP discussion was held with the patient during this visit. Patient has an advance directive (not in EMR), copy requested.         Return to clinic in 6 months, or sooner as needed.    Thank you for the opportunity to share in the care of your patient; please do not hesitate to call me with any questions.     Jordan Pedraza MD, [facility], [facility]  Office: [phone] [address] [id number]    06/02/25

## 2025-06-27 ENCOUNTER — TELEPHONE (OUTPATIENT)
Facility: HOSPITAL | Age: 75
End: 2025-06-27
Payer: MEDICARE

## 2025-06-27 NOTE — TELEPHONE ENCOUNTER
Attempted to contact patient as pre-procedure phone call prior to planned CT angiogram coronary planned for 6/30/25. Left voicemail message reminder with arrival time of 1430 to main registration, no caffeine after midnight, okay to take medications as per normal routine unless taking a stimulant,  is recommended, and if have any questions may contact outpatient prep and recovery at [phone].

## 2025-06-30 ENCOUNTER — HOSPITAL ENCOUNTER (OUTPATIENT)
Facility: HOSPITAL | Age: 75
Discharge: HOME | End: 2025-06-30
Payer: MEDICARE

## 2025-06-30 VITALS
WEIGHT: 223.88 LBS | BODY MASS INDEX: 26.43 KG/M2 | HEART RATE: 68 BPM | TEMPERATURE: 97.8 F | RESPIRATION RATE: 20 BRPM | DIASTOLIC BLOOD PRESSURE: 73 MMHG | OXYGEN SATURATION: 98 % | HEIGHT: 77 IN | SYSTOLIC BLOOD PRESSURE: 126 MMHG

## 2025-06-30 DIAGNOSIS — R00.1 BRADYCARDIA, SINUS: ICD-10-CM

## 2025-06-30 DIAGNOSIS — R07.89 OTHER CHEST PAIN: ICD-10-CM

## 2025-06-30 DIAGNOSIS — Z82.49 FAMILY HISTORY OF CORONARY ARTERIOSCLEROSIS: ICD-10-CM

## 2025-06-30 DIAGNOSIS — Z82.49 FAMILY HISTORY OF ABDOMINAL AORTIC ANEURYSM (AAA): ICD-10-CM

## 2025-06-30 DIAGNOSIS — R06.09 DYSPNEA ON EXERTION: ICD-10-CM

## 2025-06-30 DIAGNOSIS — I44.0 1ST DEGREE AV BLOCK: ICD-10-CM

## 2025-06-30 DIAGNOSIS — R06.09 DYSPNEA ON EXERTION: Chronic | ICD-10-CM

## 2025-06-30 PROCEDURE — A9270 NON-COVERED ITEM OR SERVICE: HCPCS | Performed by: INTERNAL MEDICINE

## 2025-06-30 PROCEDURE — 25510000001 IOPAMIDOL PER 1 ML: Performed by: INTERNAL MEDICINE

## 2025-06-30 PROCEDURE — 75574 CT ANGIO HRT W/3D IMAGE: CPT

## 2025-06-30 PROCEDURE — 63710000001 NITROGLYCERIN 0.4 MG SUBLINGUAL TABLET: Performed by: INTERNAL MEDICINE

## 2025-06-30 RX ORDER — METOPROLOL TARTRATE 100 MG/1
100 TABLET ORAL ONCE
Status: DISCONTINUED | OUTPATIENT
Start: 2025-06-30 | End: 2025-07-01 | Stop reason: HOSPADM

## 2025-06-30 RX ORDER — NITROGLYCERIN 0.4 MG/1
0.8 TABLET SUBLINGUAL
Status: COMPLETED | OUTPATIENT
Start: 2025-06-30 | End: 2025-06-30

## 2025-06-30 RX ORDER — METOPROLOL TARTRATE 25 MG/1
50 TABLET, FILM COATED ORAL
Status: DISCONTINUED | OUTPATIENT
Start: 2025-06-30 | End: 2025-07-01 | Stop reason: HOSPADM

## 2025-06-30 RX ORDER — IOPAMIDOL 755 MG/ML
100 INJECTION, SOLUTION INTRAVASCULAR
Status: COMPLETED | OUTPATIENT
Start: 2025-06-30 | End: 2025-06-30

## 2025-06-30 RX ORDER — SODIUM CHLORIDE 0.9 % (FLUSH) 0.9 %
10 SYRINGE (ML) INJECTION AS NEEDED
Status: DISCONTINUED | OUTPATIENT
Start: 2025-06-30 | End: 2025-07-01 | Stop reason: HOSPADM

## 2025-06-30 RX ORDER — NITROGLYCERIN 0.4 MG/1
0.4 TABLET SUBLINGUAL
Status: COMPLETED | OUTPATIENT
Start: 2025-06-30 | End: 2025-06-30

## 2025-06-30 RX ORDER — METOPROLOL TARTRATE 1 MG/ML
5 INJECTION, SOLUTION INTRAVENOUS
Status: DISCONTINUED | OUTPATIENT
Start: 2025-06-30 | End: 2025-07-01 | Stop reason: HOSPADM

## 2025-06-30 RX ORDER — SODIUM CHLORIDE 9 MG/ML
40 INJECTION, SOLUTION INTRAVENOUS AS NEEDED
Status: DISCONTINUED | OUTPATIENT
Start: 2025-06-30 | End: 2025-07-01 | Stop reason: HOSPADM

## 2025-06-30 RX ORDER — METOPROLOL TARTRATE 25 MG/1
50 TABLET, FILM COATED ORAL ONCE
Status: DISCONTINUED | OUTPATIENT
Start: 2025-06-30 | End: 2025-07-01 | Stop reason: HOSPADM

## 2025-06-30 RX ORDER — IVABRADINE 5 MG/1
15 TABLET, FILM COATED ORAL ONCE
Status: DISCONTINUED | OUTPATIENT
Start: 2025-06-30 | End: 2025-07-01 | Stop reason: HOSPADM

## 2025-06-30 RX ORDER — SODIUM CHLORIDE 0.9 % (FLUSH) 0.9 %
10 SYRINGE (ML) INJECTION EVERY 12 HOURS SCHEDULED
Status: DISCONTINUED | OUTPATIENT
Start: 2025-06-30 | End: 2025-07-01 | Stop reason: HOSPADM

## 2025-06-30 RX ORDER — METOPROLOL TARTRATE 100 MG/1
200 TABLET ORAL ONCE
Status: DISCONTINUED | OUTPATIENT
Start: 2025-06-30 | End: 2025-07-01 | Stop reason: HOSPADM

## 2025-06-30 RX ADMIN — IOPAMIDOL 150 ML: 755 INJECTION, SOLUTION INTRAVENOUS at 15:10

## 2025-06-30 RX ADMIN — NITROGLYCERIN 0.8 MG: 0.4 TABLET SUBLINGUAL at 14:49

## 2025-07-02 ENCOUNTER — RESULTS FOLLOW-UP (OUTPATIENT)
Dept: CARDIOLOGY | Facility: CLINIC | Age: 75
End: 2025-07-02
Payer: MEDICARE

## 2025-07-02 DIAGNOSIS — I10 PRIMARY HYPERTENSION: Primary | Chronic | ICD-10-CM

## 2025-07-02 DIAGNOSIS — R06.09 DYSPNEA ON EXERTION: Chronic | ICD-10-CM

## 2025-07-02 DIAGNOSIS — E78.5 HYPERLIPIDEMIA LDL GOAL <70: ICD-10-CM

## 2025-07-02 RX ORDER — ROSUVASTATIN CALCIUM 20 MG/1
20 TABLET, COATED ORAL DAILY
Qty: 90 TABLET | Refills: 0 | Status: SHIPPED | OUTPATIENT
Start: 2025-07-02

## 2025-07-02 NOTE — TELEPHONE ENCOUNTER
----- Message from Jordan Pedraza sent at 7/2/2025 12:52 PM EDT -----  Continue aspirin 81 mg daily and start rosuvastatin 20 mg daily with repeat CMP and lipids in 3 months for goal LDL less than 100.  ----- Message -----  From: Jordan Pedraza MD  Sent: 7/2/2025  12:52 PM EDT  To: Jordan Pedraza MD

## 2025-07-07 ENCOUNTER — TELEPHONE (OUTPATIENT)
Dept: CARDIOLOGY | Facility: CLINIC | Age: 75
End: 2025-07-07
Payer: MEDICARE

## 2025-07-07 NOTE — TELEPHONE ENCOUNTER
Spoke with patient in regards to his medication changes (added statin and asa). Patient is also wanting to know that since his cardiac workup does not show a reason why he has dyspnea with exertion, if there was any medication changes or further recommendations.

## 2025-08-15 ENCOUNTER — OFFICE VISIT (OUTPATIENT)
Dept: ORTHOPEDIC SURGERY | Facility: CLINIC | Age: 75
End: 2025-08-15
Payer: MEDICARE

## 2025-08-15 VITALS
DIASTOLIC BLOOD PRESSURE: 82 MMHG | HEIGHT: 77 IN | WEIGHT: 230.8 LBS | SYSTOLIC BLOOD PRESSURE: 132 MMHG | BODY MASS INDEX: 27.25 KG/M2

## 2025-08-15 DIAGNOSIS — S76.311A HAMSTRING STRAIN, RIGHT, INITIAL ENCOUNTER: ICD-10-CM

## 2025-08-15 DIAGNOSIS — M17.11 PRIMARY OSTEOARTHRITIS OF RIGHT KNEE: ICD-10-CM

## 2025-08-15 DIAGNOSIS — M25.561 POSTERIOR KNEE PAIN, RIGHT: Primary | ICD-10-CM

## 2025-08-15 PROCEDURE — 3079F DIAST BP 80-89 MM HG: CPT | Performed by: PHYSICIAN ASSISTANT

## 2025-08-15 PROCEDURE — 99213 OFFICE O/P EST LOW 20 MIN: CPT | Performed by: PHYSICIAN ASSISTANT

## 2025-08-15 PROCEDURE — 3075F SYST BP GE 130 - 139MM HG: CPT | Performed by: PHYSICIAN ASSISTANT

## 2025-08-15 PROCEDURE — 1159F MED LIST DOCD IN RCRD: CPT | Performed by: PHYSICIAN ASSISTANT

## 2025-08-15 PROCEDURE — 1160F RVW MEDS BY RX/DR IN RCRD: CPT | Performed by: PHYSICIAN ASSISTANT

## (undated) DEVICE — BNDG ELAS CO-FLEX SLF ADHR 6IN 5YD LF STRL

## (undated) DEVICE — PK HIP TOTL UNIV 10

## (undated) DEVICE — TRY EPID SFTY 18G 3.5IN 1T7680

## (undated) DEVICE — SYS CLS SKIN PREMIERPRO EXOFINFUSION 22CM

## (undated) DEVICE — STCKNT IMPERV 12IN STRL

## (undated) DEVICE — TBG PENCL TELESCP MEGADYNE SMOKE EVAC 10FT

## (undated) DEVICE — ANTIBACTERIAL UNDYED BRAIDED (POLYGLACTIN 910), SYNTHETIC ABSORBABLE SUTURE: Brand: COATED VICRYL

## (undated) DEVICE — BLANKT WARM UPPR/BDY ARM/OUT 57X196CM

## (undated) DEVICE — TOWEL,OR,DSP,ST,BLUE,STD,4/PK,20PK/CS: Brand: MEDLINE

## (undated) DEVICE — GLV SURG SENSICARE W/ALOE PF LF 9 STRL

## (undated) DEVICE — 3M™ STERI-DRAPE™ INSTRUMENT POUCH 1018: Brand: STERI-DRAPE™

## (undated) DEVICE — SPNG GZ WOVN 4X4IN 12PLY 10/BX STRL

## (undated) DEVICE — GLV SURG PREMIERPRO MIC LTX PF SZ8.5 BRN

## (undated) DEVICE — C-ARM DRAPE: Brand: DEROYAL

## (undated) DEVICE — DRAPE,TOP,102X53,STERILE: Brand: MEDLINE

## (undated) DEVICE — TRAP FLD MINIVAC MEGADYNE 100ML

## (undated) DEVICE — ELECTRD BLD EZ CLN STD 4IN

## (undated) DEVICE — DRAPE,REIN 53X77,STERILE: Brand: MEDLINE

## (undated) DEVICE — C-ARM: Brand: UNBRANDED

## (undated) DEVICE — DRAPE,T,LIMB,BILATERAL,STERILE: Brand: MEDLINE

## (undated) DEVICE — STRYKER PERFORMANCE SERIES SAGITTAL BLADE: Brand: STRYKER PERFORMANCE SERIES

## (undated) DEVICE — PATIENT RETURN ELECTRODE, SINGLE-USE, CONTACT QUALITY MONITORING, ADULT, WITH 9FT CORD, FOR PATIENTS WEIGING OVER 33LBS. (15KG): Brand: MEGADYNE

## (undated) DEVICE — TP SILK DURAPORE 3IN

## (undated) DEVICE — 3M™ MEDIPORE™ H SOFT CLOTH SURGICAL TAPE, 2863, 3 IN X 10 YD, 12/CASE: Brand: 3M™ MEDIPORE™